# Patient Record
Sex: MALE | Race: NATIVE HAWAIIAN OR OTHER PACIFIC ISLANDER | NOT HISPANIC OR LATINO | URBAN - METROPOLITAN AREA
[De-identification: names, ages, dates, MRNs, and addresses within clinical notes are randomized per-mention and may not be internally consistent; named-entity substitution may affect disease eponyms.]

---

## 2020-01-21 ENCOUNTER — INPATIENT (INPATIENT)
Facility: HOSPITAL | Age: 50
LOS: 2 days | Discharge: HOME | DRG: 247 | End: 2020-01-24
Attending: INTERNAL MEDICINE | Admitting: INTERNAL MEDICINE
Payer: COMMERCIAL

## 2020-01-21 VITALS
TEMPERATURE: 98 F | HEIGHT: 69 IN | HEART RATE: 70 BPM | WEIGHT: 179.9 LBS | SYSTOLIC BLOOD PRESSURE: 145 MMHG | RESPIRATION RATE: 20 BRPM | DIASTOLIC BLOOD PRESSURE: 100 MMHG | OXYGEN SATURATION: 97 %

## 2020-01-21 DIAGNOSIS — I25.10 ATHEROSCLEROTIC HEART DISEASE OF NATIVE CORONARY ARTERY WITHOUT ANGINA PECTORIS: ICD-10-CM

## 2020-01-21 LAB
ALBUMIN SERPL ELPH-MCNC: 4.1 G/DL — SIGNIFICANT CHANGE UP (ref 3.3–5)
ALP SERPL-CCNC: 86 U/L — SIGNIFICANT CHANGE UP (ref 40–120)
ALT FLD-CCNC: 44 U/L — SIGNIFICANT CHANGE UP (ref 10–45)
ANION GAP SERPL CALC-SCNC: 12 MMOL/L — SIGNIFICANT CHANGE UP (ref 5–17)
AST SERPL-CCNC: 41 U/L — HIGH (ref 10–40)
BILIRUB SERPL-MCNC: 0.4 MG/DL — SIGNIFICANT CHANGE UP (ref 0.2–1.2)
BUN SERPL-MCNC: 11 MG/DL — SIGNIFICANT CHANGE UP (ref 7–23)
CALCIUM SERPL-MCNC: 9 MG/DL — SIGNIFICANT CHANGE UP (ref 8.4–10.5)
CHLORIDE SERPL-SCNC: 101 MMOL/L — SIGNIFICANT CHANGE UP (ref 96–108)
CO2 SERPL-SCNC: 24 MMOL/L — SIGNIFICANT CHANGE UP (ref 22–31)
CREAT SERPL-MCNC: 0.82 MG/DL — SIGNIFICANT CHANGE UP (ref 0.5–1.3)
D DIMER BLD IA.RAPID-MCNC: 197 NG/ML DDU — SIGNIFICANT CHANGE UP
GLUCOSE SERPL-MCNC: 100 MG/DL — HIGH (ref 70–99)
HCT VFR BLD CALC: 43.8 % — SIGNIFICANT CHANGE UP (ref 39–50)
HGB BLD-MCNC: 15 G/DL — SIGNIFICANT CHANGE UP (ref 13–17)
MCHC RBC-ENTMCNC: 30.9 PG — SIGNIFICANT CHANGE UP (ref 27–34)
MCHC RBC-ENTMCNC: 34.2 GM/DL — SIGNIFICANT CHANGE UP (ref 32–36)
MCV RBC AUTO: 90.1 FL — SIGNIFICANT CHANGE UP (ref 80–100)
NRBC # BLD: 0 /100 WBCS — SIGNIFICANT CHANGE UP (ref 0–0)
NT-PROBNP SERPL-SCNC: 11 PG/ML — SIGNIFICANT CHANGE UP (ref 0–300)
PLATELET # BLD AUTO: 205 K/UL — SIGNIFICANT CHANGE UP (ref 150–400)
POTASSIUM SERPL-MCNC: 4.2 MMOL/L — SIGNIFICANT CHANGE UP (ref 3.5–5.3)
POTASSIUM SERPL-SCNC: 4.2 MMOL/L — SIGNIFICANT CHANGE UP (ref 3.5–5.3)
PROT SERPL-MCNC: 7.1 G/DL — SIGNIFICANT CHANGE UP (ref 6–8.3)
RBC # BLD: 4.86 M/UL — SIGNIFICANT CHANGE UP (ref 4.2–5.8)
RBC # FLD: 12.4 % — SIGNIFICANT CHANGE UP (ref 10.3–14.5)
SODIUM SERPL-SCNC: 137 MMOL/L — SIGNIFICANT CHANGE UP (ref 135–145)
TROPONIN T, HIGH SENSITIVITY RESULT: <6 NG/L — SIGNIFICANT CHANGE UP (ref 0–51)
TROPONIN T, HIGH SENSITIVITY RESULT: <6 NG/L — SIGNIFICANT CHANGE UP (ref 0–51)
WBC # BLD: 5.39 K/UL — SIGNIFICANT CHANGE UP (ref 3.8–10.5)
WBC # FLD AUTO: 5.39 K/UL — SIGNIFICANT CHANGE UP (ref 3.8–10.5)

## 2020-01-21 PROCEDURE — 99223 1ST HOSP IP/OBS HIGH 75: CPT

## 2020-01-21 PROCEDURE — 71046 X-RAY EXAM CHEST 2 VIEWS: CPT | Mod: 26

## 2020-01-21 PROCEDURE — 99285 EMERGENCY DEPT VISIT HI MDM: CPT

## 2020-01-21 PROCEDURE — 93010 ELECTROCARDIOGRAM REPORT: CPT | Mod: 59

## 2020-01-21 RX ORDER — NITROGLYCERIN 6.5 MG
0.4 CAPSULE, EXTENDED RELEASE ORAL
Refills: 0 | Status: DISCONTINUED | OUTPATIENT
Start: 2020-01-21 | End: 2020-01-23

## 2020-01-21 RX ORDER — SODIUM CHLORIDE 9 MG/ML
3 INJECTION INTRAMUSCULAR; INTRAVENOUS; SUBCUTANEOUS EVERY 8 HOURS
Refills: 0 | Status: DISCONTINUED | OUTPATIENT
Start: 2020-01-21 | End: 2020-01-24

## 2020-01-21 RX ORDER — ASPIRIN/CALCIUM CARB/MAGNESIUM 324 MG
243 TABLET ORAL ONCE
Refills: 0 | Status: COMPLETED | OUTPATIENT
Start: 2020-01-21 | End: 2020-01-21

## 2020-01-21 RX ORDER — SODIUM CHLORIDE 9 MG/ML
1000 INJECTION INTRAMUSCULAR; INTRAVENOUS; SUBCUTANEOUS ONCE
Refills: 0 | Status: COMPLETED | OUTPATIENT
Start: 2020-01-21 | End: 2020-01-21

## 2020-01-21 RX ORDER — ASPIRIN/CALCIUM CARB/MAGNESIUM 324 MG
81 TABLET ORAL DAILY
Refills: 0 | Status: DISCONTINUED | OUTPATIENT
Start: 2020-01-21 | End: 2020-01-23

## 2020-01-21 RX ADMIN — Medication 81 MILLIGRAM(S): at 22:36

## 2020-01-21 RX ADMIN — Medication 0.4 MILLIGRAM(S): at 23:42

## 2020-01-21 RX ADMIN — SODIUM CHLORIDE 1000 MILLILITER(S): 9 INJECTION INTRAMUSCULAR; INTRAVENOUS; SUBCUTANEOUS at 23:45

## 2020-01-21 RX ADMIN — Medication 0.4 MILLIGRAM(S): at 22:36

## 2020-01-21 RX ADMIN — Medication 243 MILLIGRAM(S): at 22:37

## 2020-01-21 NOTE — H&P ADULT - NSICDXFAMILYHX_GEN_ALL_CORE_FT
FAMILY HISTORY:  FH: CAD (coronary artery disease), Father s/p stent at 71 y/o age  FH: diabetes mellitus, Mother  FH: hypertension, Mother

## 2020-01-21 NOTE — H&P ADULT - NSHPATTENDINGPLANDISCUSS_GEN_ALL_CORE
Dr. Ham who requested for initial evaluation/H&P and will assume care of this patient in am of 1/22/20.

## 2020-01-21 NOTE — ED ADULT TRIAGE NOTE - CHIEF COMPLAINT QUOTE
Patient reports CP x2 days. Pain is L sided radiating to the L arm and back. Endorses pain in the back of the head. PMHx CAD w/ stent, MI, HTN, HLD. Takes 81mg ASA daily. Patient reports CP x2 days. Pain is L sided radiating to the L arm and back. Endorses pain in the back of the head. Patient flew in from [facility] 3 days ago. PMHx CAD w/ stent, MI, HTN, HLD. Takes 81mg ASA daily.

## 2020-01-21 NOTE — H&P ADULT - HISTORY OF PRESENT ILLNESS
48 yo Malaysian male pmhx of HTN, HLD, CAD sp 1 stent 4 years ago, flew back from [facility] 3 days ago, presents with 2 days of left retrosternal stabbing chest pain radiating to L arm and neck, not pleuritic, not reproducible. Pt states he was sitting on the cough when it started. Pt endorse sensation of "food stuck in throat", no SOB, no nausea, no vomiting, no calf pain. Due to insurance reasons, pt has been taking no meds for the past 6 months, only ASA81 daily. took it today. 48 yo Chinese male pmhx of HTN, HLD, CAD sp 1 stent 4 years ago, flew back from [facility] 3 days ago, presents with 2 days of left retrosternal stabbing chest pain radiating to L arm and neck, not pleuritic, not reproducible. Pt states he was sitting on the cough when it started. Pt endorse sensation of "food stuck in throat", no SOB, no nausea, no vomiting, no calf pain. Due to insurance reasons, pt has been taking no meds for the past 6 months, only ASA81 daily. took it today.   non compliant with meds except with asa (due to insurance) who is presenting with 3-4 days of progressive chest heaviness, feeling of a globe in chest and lower throat, dizziness, and shortness of breath. reports feeling more out of breath with movements. denies fever chills, denies n/v, abd pain. no leg swelling.   pt had a syncopal event 1-2 months prior. also traveled back on long haul flight same time symptoms started. 48 yo Ethiopian male pmhx of HTN, HLD, CAD s/p stent (4 years ago at [facility] with known 60-70% obstructive CAD) p/w 2 days of intermittent left retrosternal stabbing chest pain radiating to L arm and back, lasted about a couple minutes, resolved spontaneously, no association to exertion and not similar to prior cardiac pain (heaviness).  Patient visited [facility] for 10 days (J[place] ceremony) and returned to [place] 3 days ago.  Patient felt "hot", chills, sore throat/"food stuck" and myalgia for the past few days which he thinks was the flu, now improved. Denies HA, cough, SOB, n/v, abd pain, SOB, or diarrhea. Patient exercises regularly (1hr on treadmill 3x a week), but has not exercise in the past month due to travel.  Last routine stress test was 7 months ago and reported normal.  Patient has not been taking BP and HLD meds for tony past 7 months due to loss of insurance, but taking daily ASA 81.  Patient had a syncopal episode 2 months ago while visiting a friend at [facility], followed by nausea/vomiting.  He was hospitalized shortly to "monitor the heart, blood tests x3, and ultrasound of the heart".  Patient had 3 episodes of CP in ED, received NTG SL x3 with improvement of CP.  Currently CP free.

## 2020-01-21 NOTE — CONSULT NOTE ADULT - ASSESSMENT
EKG SR     Echo pending     Assessment and Plan    1) CP: Pt has a history of CAD s/p MI and PCI 4 years ago at [facility], was told that he has residual 60-70% stenosis CP improved with nitro in the ER, also wife states he had an episode of syncope 1 month ago. EKG non ischemic , 1st  set of trops -chaz,  Will get echo and plan for cath c/w asa , start lipitor 80, metoprolol XL 25 mg po daily, if delta trop + would start heparin drip, monitor on tele       2) HTN: start metoprolol 25 mg po daily     3) DVT PPX recommend Lovenox

## 2020-01-21 NOTE — ED ADULT NURSE NOTE - CHIEF COMPLAINT QUOTE
Patient reports CP x2 days. Pain is L sided radiating to the L arm and back. Endorses pain in the back of the head. Patient flew in from [facility] 3 days ago. PMHx CAD w/ stent, MI, HTN, HLD. Takes 81mg ASA daily.

## 2020-01-21 NOTE — CONSULT NOTE ADULT - SUBJECTIVE AND OBJECTIVE BOX
Rodney Gillespie MD  Interventional Cardiology / Endovascular Specialist  [place] Office : [address] [place], [place] [address]  Tel:   [place] Office : [address] [facility] [place] [address]  Tel: [phone]  Cell : [phone]      HISTORY OF PRESENTING ILLNESS:    50 yo Kuwaiti male pmhx of HTN, HLD, CAD sp 1 stent 4 years ago, flew back from [facility] 3 days ago, presents with 2 days of left retrosternal stabbing chest pain radiating to L arm and neck, not pleuritic, not reproducible. Pt states he was sitting on the cough when it started. no SOB, no nausea, no vomiting, no calf pain. Due to insurance reasons, pt has been taking no meds for the past 6 months, only ASA81 daily. took it today.    PAST MEDICAL & SURGICAL HISTORY:  CAD (coronary artery disease)  HLD (hyperlipidemia)  HTN (hypertension)  No significant past surgical history      SOCIAL HISTORY: Substance Use (street drugs): ( x ) never used  (  ) other:    FAMILY HISTORY:      REVIEW OF SYSTEMS:  CONSTITUTIONAL: No fever, weight loss, or fatigue  EYES: No eye pain, visual disturbances, or discharge  ENMT:  No difficulty hearing, tinnitus, vertigo; No sinus or throat pain  BREASTS: No pain, masses, or nipple discharge  GASTROINTESTINAL: No abdominal or epigastric pain. No nausea, vomiting, or hematemesis; No diarrhea or constipation. No melena or hematochezia.  GENITOURINARY: No dysuria, frequency, hematuria, or incontinence  NEUROLOGICAL: No headaches, memory loss, loss of strength, numbness, or tremors  ENDOCRINE: No heat or cold intolerance; No hair loss  MUSCULOSKELETAL: No joint pain or swelling; No muscle, back, or extremity pain  PSYCHIATRIC: No depression, anxiety, mood swings, or difficulty sleeping  HEME/LYMPH: No easy bruising, or bleeding gums  All others negative    MEDICATIONS:  aspirin  chewable 81 milliGRAM(s) Oral daily  nitroglycerin     SubLingual 0.4 milliGRAM(s) SubLingual every 5 minutes PRN              sodium chloride 0.9% lock flush 3 milliLiter(s) IV Push every 8 hours      FAMILY HISTORY:        Allergies    No Known Allergies    Intolerances    	      PHYSICAL EXAM:  T(C): 36.8 (01-21-20 @ 22:42), Max: 37.3 (01-21-20 @ 20:21)  HR: 90 (01-21-20 @ 22:42) (68 - 90)  BP: 133/82 (01-21-20 @ 22:42) (133/82 - 145/100)  RR: 20 (01-21-20 @ 22:42) (20 - 20)  SpO2: 99% (01-21-20 @ 22:42) (97% - 99%)  Wt(kg): --  I&O's Summary      GENERAL: NAD, well-groomed, well-developed  EYES: EOMI, PERRLA, conjunctiva and sclera clear  ENMT: No tonsillar erythema, exudates, or enlargement; Moist mucous membranes, Good dentition, No lesions  Cardiovascular: Normal S1 S2, No JVD, No murmurs, No edema  Respiratory: Lungs clear to auscultation	  Gastrointestinal:  Soft, Non-tender, + BS	  Extremities: Normal range of motion, No clubbing, cyanosis or edema  LYMPH: No lymphadenopathy noted  NERVOUS SYSTEM:  Alert & Oriented X3, Good concentration; Motor Strength 5/5 B/L upper and lower extremities; DTRs 2+ intact and symmetric      LABS:	 	    CARDIAC MARKERS:                                  15.0   5.39  )-----------( 205      ( 21 Jan 2020 20:30 )             43.8     01-21    137  |  101  |  11  ----------------------------<  100<H>  4.2   |  24  |  0.82    Ca    9.0      21 Jan 2020 20:30    TPro  7.1  /  Alb  4.1  /  TBili  0.4  /  DBili  x   /  AST  41<H>  /  ALT  44  /  AlkPhos  86  01-21    proBNP: Serum Pro-Brain Natriuretic Peptide: 11 pg/mL (01-21 @ 20:30)    Lipid Profile:   HgA1c:   TSH:     Consultant(s) Notes Reviewed:  [x ] YES  [ ] NO    Care Discussed with Consultants/Other Providers [ x] YES  [ ] NO    Imaging Personally Reviewed independently:  [x] YES  [ ] NO    All labs, radiologic studies, vitals, orders and medications list reviewed. Patient is seen and examined at bedside. Case discussed with medical team.    ASSESSMENT/PLAN:

## 2020-01-21 NOTE — H&P ADULT - CONSTITUTIONAL
----- Message from Dean Price MD sent at 10/23/2019  1:07 PM CDT -----  Normal results.
Leandra with [facility] called to let Dr. Dimitri Geronimo know that the pt completed her mammogram today and the report is in Epic. Message routed to provider JOSELYN.
[facility] results released through [address] [place] [address] [address].
ok
detailed exam

## 2020-01-21 NOTE — H&P ADULT - ADDITIONAL PE
T(F): 98.2 (21 Jan 2020 22:42), Max: 99.1 (21 Jan 2020 20:21)  HR: 68 - 90  BP: 133/82 - 145/100  RR: 20  SpO2: 97% - 99%

## 2020-01-21 NOTE — H&P ADULT - PROBLEM SELECTOR PLAN 1
EARL 3   - will check serial cardiac enzymes  - tele monitor for arrhythmia   - will treat with ASA, Toprol and high intensity statin  - will start Hep gtt if Trop +  - Cardio eval appreciated  - will check TTE to eval wall motion  - will check FLP to risk stratify

## 2020-01-21 NOTE — ED PROVIDER NOTE - ATTENDING CONTRIBUTION TO CARE
49yr M hx of htn, hl, MI s/p stent 4 yrs ago who has been non compliant with meds except with asa (due to insurance) who is presenting with 3-4 days of progressive chest heaviness, feeling of a globe in chest and lower throat, dizziness, and shortness of breath. reports feeling more out of breath with movements. denies fever chills, denies n/v, abd pain. no leg swelling.   pt had a syncopal event 1-2 months prior. also traveled back on long haul flight same time symptoms started.  exam notable for calm and comfortable appearing, no murmur, clear lungs, soft non tender abd, no leg swelling.  concern for ACS high, low concern for PE however pt is moderate risk so d dimer to rule out.   if trop neg, will stay for serial trops, and tele given syncopal event as well as AM echo and cards for re-establishing care.

## 2020-01-21 NOTE — ED ADULT NURSE NOTE - OBJECTIVE STATEMENT
49YOM pmh stent 4 years ago, HTN presents to ED c/o CP x2 days. Left sided and feels stabbing. Pt was diagnosed with flu couple days ago. Pt states he first felt like it was his flu making him feel that way, but got worse. Denies SOB, fever, chills, abp pain, N/V/D, burning upon urination. Safety and comfort measures provided. Pt on cardiac monitor, EKG done. Will continue to monitor. Wife at bedside.

## 2020-01-21 NOTE — ED PROVIDER NOTE - PHYSICAL EXAMINATION
General: Patient awake alert NAD.   HEENT: normocephalic, atraumatic, EOMI, no scleral icterus, moist MM  Cardiac: RRR, S1, S2, no murmur, equal bilateral radial pulse.   LUNGS: CTA B/L no wheeze, rhonchi, rales.   Abdomen: soft NT, ND, no rebound no guarding, no CVA tenderness.   EXT: no edema. 5/5 strength and full ROM in all extremities.     Neuro: A&Ox3, gait normal, no focal neurological deficits, CN 2-12 grossly intact  Skin: warm, dry, no rash.

## 2020-01-21 NOTE — ED PROVIDER NOTE - NS ED ROS FT
GENERAL: No fever, chills  EYES: no vision changes, no discharge.   HEENT: no difficulty swallowing or speaking   CARDIAC: + chest pain, no SOB, no lower ex edema  PULMONARY: no cough, no SOB  GI: no abdominal pain, n/v/d  : no dysuria  SKIN: no rashes  NEURO: + headache, no lightheadedness, no paresthesia  MSK: No joint pain, myalgia, weakness.

## 2020-01-21 NOTE — H&P ADULT - ASSESSMENT
48 yo [place] Islander male pmhx of HTN, HLD, CAD sp 1 stent 4 years ago p/w chest pain a/w 48 yo Eritrean male pmhx of HTN, HLD, CAD sp 1 stent 4 years ago p/w chest pain a/w     Pt has a history of CAD s/p MI and PCI 4 years ago at [facility], was told that he has residual 60-70% stenosis CP improved with nitro in the ER, also wife states he had an episode of syncope 1 month ago. EKG non ischemic , 1st  set of trops -chaz,  Will get echo and plan for cath c/w asa , start lipitor 80, metoprolol XL 25 mg po daily, if delta trop + would start heparin drip, monitor on tele 50 yo Spanish male pmhx of HTN, HLD, CAD s/p stent (4 years ago at [facility] with known 60-70% obstructive CAD) p/w L sided CP admitted with concern for unstable angina.

## 2020-01-21 NOTE — ED PROVIDER NOTE - OBJECTIVE STATEMENT
50 yo Ivorian male pmhx of HTN, HLD, CAD sp 1 stent 4 years ago, flew back from [facility] 3 days ago, presents with 2 days of left retrosternal stabbing chest pain radiating to L arm and neck, not pleuritic, not reproducible. Pt states he was sitting on the cough when it started. Pt endorse sensation of "food stuck in throat", no SOB, no nausea, no vomiting, no calf pain. Due to insurance reasons, pt has been taking no meds for the past 6 months, only ASA81 daily. took it today.

## 2020-01-21 NOTE — ED ADULT NURSE REASSESSMENT NOTE - NS ED NURSE REASSESS COMMENT FT1
Pt had episode of lightheadedness. MD Ayers at bedside. EKG repeat. Pt A&Ox4. 1L of NS saline given. Pt resting comfortably. In no acute distress. Wife at bedside.

## 2020-01-22 DIAGNOSIS — R09.89 OTHER SPECIFIED SYMPTOMS AND SIGNS INVOLVING THE CIRCULATORY AND RESPIRATORY SYSTEMS: ICD-10-CM

## 2020-01-22 DIAGNOSIS — I10 ESSENTIAL (PRIMARY) HYPERTENSION: ICD-10-CM

## 2020-01-22 DIAGNOSIS — E78.49 OTHER HYPERLIPIDEMIA: ICD-10-CM

## 2020-01-22 DIAGNOSIS — I25.110 ATHEROSCLEROTIC HEART DISEASE OF NATIVE CORONARY ARTERY WITH UNSTABLE ANGINA PECTORIS: ICD-10-CM

## 2020-01-22 DIAGNOSIS — Z90.49 ACQUIRED ABSENCE OF OTHER SPECIFIED PARTS OF DIGESTIVE TRACT: Chronic | ICD-10-CM

## 2020-01-22 DIAGNOSIS — Z29.9 ENCOUNTER FOR PROPHYLACTIC MEASURES, UNSPECIFIED: ICD-10-CM

## 2020-01-22 LAB
ANION GAP SERPL CALC-SCNC: 8 MMOL/L — SIGNIFICANT CHANGE UP (ref 5–17)
BUN SERPL-MCNC: 10 MG/DL — SIGNIFICANT CHANGE UP (ref 7–23)
CALCIUM SERPL-MCNC: 8.9 MG/DL — SIGNIFICANT CHANGE UP (ref 8.4–10.5)
CHLORIDE SERPL-SCNC: 100 MMOL/L — SIGNIFICANT CHANGE UP (ref 96–108)
CHOLEST SERPL-MCNC: 220 MG/DL — HIGH (ref 10–199)
CK MB BLD-MCNC: 1.9 % — SIGNIFICANT CHANGE UP (ref 0–3.5)
CK MB CFR SERPL CALC: 2 NG/ML — SIGNIFICANT CHANGE UP (ref 0–6.7)
CK SERPL-CCNC: 103 U/L — SIGNIFICANT CHANGE UP (ref 30–200)
CO2 SERPL-SCNC: 24 MMOL/L — SIGNIFICANT CHANGE UP (ref 22–31)
CREAT SERPL-MCNC: 0.75 MG/DL — SIGNIFICANT CHANGE UP (ref 0.5–1.3)
GLUCOSE SERPL-MCNC: 141 MG/DL — HIGH (ref 70–99)
HBA1C BLD-MCNC: 5.6 % — SIGNIFICANT CHANGE UP (ref 4–5.6)
HCT VFR BLD CALC: 42.4 % — SIGNIFICANT CHANGE UP (ref 39–50)
HDLC SERPL-MCNC: 45 MG/DL — SIGNIFICANT CHANGE UP
HGB BLD-MCNC: 14.4 G/DL — SIGNIFICANT CHANGE UP (ref 13–17)
LIPID PNL WITH DIRECT LDL SERPL: 141 MG/DL — HIGH
MCHC RBC-ENTMCNC: 30.7 PG — SIGNIFICANT CHANGE UP (ref 27–34)
MCHC RBC-ENTMCNC: 34 GM/DL — SIGNIFICANT CHANGE UP (ref 32–36)
MCV RBC AUTO: 90.4 FL — SIGNIFICANT CHANGE UP (ref 80–100)
NRBC # BLD: 0 /100 WBCS — SIGNIFICANT CHANGE UP (ref 0–0)
PLATELET # BLD AUTO: 195 K/UL — SIGNIFICANT CHANGE UP (ref 150–400)
POTASSIUM SERPL-MCNC: 3.7 MMOL/L — SIGNIFICANT CHANGE UP (ref 3.5–5.3)
POTASSIUM SERPL-SCNC: 3.7 MMOL/L — SIGNIFICANT CHANGE UP (ref 3.5–5.3)
RBC # BLD: 4.69 M/UL — SIGNIFICANT CHANGE UP (ref 4.2–5.8)
RBC # FLD: 12.5 % — SIGNIFICANT CHANGE UP (ref 10.3–14.5)
SODIUM SERPL-SCNC: 132 MMOL/L — LOW (ref 135–145)
TOTAL CHOLESTEROL/HDL RATIO MEASUREMENT: 4.9 RATIO — SIGNIFICANT CHANGE UP (ref 3.4–9.6)
TRIGL SERPL-MCNC: 172 MG/DL — HIGH (ref 10–149)
TROPONIN T, HIGH SENSITIVITY RESULT: <6 NG/L — SIGNIFICANT CHANGE UP (ref 0–51)
WBC # BLD: 5.4 K/UL — SIGNIFICANT CHANGE UP (ref 3.8–10.5)
WBC # FLD AUTO: 5.4 K/UL — SIGNIFICANT CHANGE UP (ref 3.8–10.5)

## 2020-01-22 PROCEDURE — 93306 TTE W/DOPPLER COMPLETE: CPT | Mod: 26

## 2020-01-22 RX ORDER — ATORVASTATIN CALCIUM 80 MG/1
80 TABLET, FILM COATED ORAL AT BEDTIME
Refills: 0 | Status: DISCONTINUED | OUTPATIENT
Start: 2020-01-22 | End: 2020-01-24

## 2020-01-22 RX ORDER — METOPROLOL TARTRATE 50 MG
25 TABLET ORAL DAILY
Refills: 0 | Status: DISCONTINUED | OUTPATIENT
Start: 2020-01-22 | End: 2020-01-24

## 2020-01-22 RX ORDER — ISOSORBIDE MONONITRATE 60 MG/1
30 TABLET, EXTENDED RELEASE ORAL DAILY
Refills: 0 | Status: DISCONTINUED | OUTPATIENT
Start: 2020-01-22 | End: 2020-01-24

## 2020-01-22 RX ORDER — ENOXAPARIN SODIUM 100 MG/ML
40 INJECTION SUBCUTANEOUS DAILY
Refills: 0 | Status: DISCONTINUED | OUTPATIENT
Start: 2020-01-22 | End: 2020-01-24

## 2020-01-22 RX ORDER — POTASSIUM CHLORIDE 20 MEQ
20 PACKET (EA) ORAL ONCE
Refills: 0 | Status: COMPLETED | OUTPATIENT
Start: 2020-01-22 | End: 2020-01-22

## 2020-01-22 RX ORDER — INFLUENZA VIRUS VACCINE 15; 15; 15; 15 UG/.5ML; UG/.5ML; UG/.5ML; UG/.5ML
0.5 SUSPENSION INTRAMUSCULAR ONCE
Refills: 0 | Status: DISCONTINUED | OUTPATIENT
Start: 2020-01-22 | End: 2020-01-24

## 2020-01-22 RX ORDER — ACETAMINOPHEN 500 MG
650 TABLET ORAL ONCE
Refills: 0 | Status: COMPLETED | OUTPATIENT
Start: 2020-01-22 | End: 2020-01-22

## 2020-01-22 RX ADMIN — SODIUM CHLORIDE 3 MILLILITER(S): 9 INJECTION INTRAMUSCULAR; INTRAVENOUS; SUBCUTANEOUS at 21:26

## 2020-01-22 RX ADMIN — Medication 81 MILLIGRAM(S): at 09:08

## 2020-01-22 RX ADMIN — SODIUM CHLORIDE 3 MILLILITER(S): 9 INJECTION INTRAMUSCULAR; INTRAVENOUS; SUBCUTANEOUS at 05:34

## 2020-01-22 RX ADMIN — Medication 20 MILLIEQUIVALENT(S): at 16:45

## 2020-01-22 RX ADMIN — Medication 650 MILLIGRAM(S): at 18:54

## 2020-01-22 RX ADMIN — Medication 25 MILLIGRAM(S): at 09:08

## 2020-01-22 RX ADMIN — ATORVASTATIN CALCIUM 80 MILLIGRAM(S): 80 TABLET, FILM COATED ORAL at 21:26

## 2020-01-22 RX ADMIN — SODIUM CHLORIDE 3 MILLILITER(S): 9 INJECTION INTRAMUSCULAR; INTRAVENOUS; SUBCUTANEOUS at 13:07

## 2020-01-22 RX ADMIN — Medication 650 MILLIGRAM(S): at 19:24

## 2020-01-22 RX ADMIN — ISOSORBIDE MONONITRATE 30 MILLIGRAM(S): 60 TABLET, EXTENDED RELEASE ORAL at 12:54

## 2020-01-22 NOTE — PROGRESS NOTE ADULT - ASSESSMENT
EKG SR     Echo pending     Assessment and Plan    1) CP: Pt has a history of CAD s/p MI and PCI 4 years ago at [facility], was told that he has residual 60-70% stenosis CP improved with nitro in the ER, also wife states he had an episode of syncope 1 month ago. EKG non ischemic , 1st  set of trops -chaz,  Will get echo and plan for cath tomorrow c/w asa , start lipitor 80, metoprolol XL 25 mg po daily,     2) HTN: start metoprolol 25 mg po daily     3) DVT PPX recommend Lovenox

## 2020-01-22 NOTE — PROGRESS NOTE ADULT - SUBJECTIVE AND OBJECTIVE BOX
Rodney Gillespie MD  Interventional Cardiology / Advance Heart Failure and Cardiac Transplant Specialist  [place] Office : [address] [place] [address] [place] [address]  Tel:   [place] Office : [address] [facility] [place] [address]  Tel: [phone]  Cell : [phone]    Pt is lying in bed comfortable not in distress, no chest pains no SOB no palpitations  	  MEDICATIONS:  aspirin  chewable 81 milliGRAM(s) Oral daily  enoxaparin Injectable 40 milliGRAM(s) SubCutaneous daily  isosorbide   mononitrate ER Tablet (IMDUR) 30 milliGRAM(s) Oral daily  metoprolol succinate ER 25 milliGRAM(s) Oral daily  nitroglycerin     SubLingual 0.4 milliGRAM(s) SubLingual every 5 minutes PRN            atorvastatin 80 milliGRAM(s) Oral at bedtime    influenza   Vaccine 0.5 milliLiter(s) IntraMuscular once  sodium chloride 0.9% lock flush 3 milliLiter(s) IV Push every 8 hours      PAST MEDICAL/SURGICAL HISTORY  PAST MEDICAL & SURGICAL HISTORY:  CAD (coronary artery disease)  HLD (hyperlipidemia)  HTN (hypertension)  S/P appendectomy      SOCIAL HISTORY: Substance Use (street drugs): ( x ) never used  (  ) other:    FAMILY HISTORY:  FH: CAD (coronary artery disease): Father s/p stent at 69 y/o age  FH: hypertension: Mother  FH: diabetes mellitus: Mother      REVIEW OF SYSTEMS:  CONSTITUTIONAL: No fever, weight loss, or fatigue  EYES: No eye pain, visual disturbances, or discharge  ENMT:  No difficulty hearing, tinnitus, vertigo; No sinus or throat pain  BREASTS: No pain, masses, or nipple discharge  GASTROINTESTINAL: No abdominal or epigastric pain. No nausea, vomiting, or hematemesis; No diarrhea or constipation. No melena or hematochezia.  GENITOURINARY: No dysuria, frequency, hematuria, or incontinence  NEUROLOGICAL: No headaches, memory loss, loss of strength, numbness, or tremors  ENDOCRINE: No heat or cold intolerance; No hair loss  MUSCULOSKELETAL: No joint pain or swelling; No muscle, back, or extremity pain  PSYCHIATRIC: No depression, anxiety, mood swings, or difficulty sleeping  HEME/LYMPH: No easy bruising, or bleeding gums  All others negative    PHYSICAL EXAM:  T(C): 36.6 (01-22-20 @ 12:00), Max: 37.3 (01-21-20 @ 20:21)  HR: 68 (01-22-20 @ 12:00) (55 - 90)  BP: 116/67 (01-22-20 @ 12:00) (94/57 - 145/100)  RR: 18 (01-22-20 @ 12:00) (16 - 20)  SpO2: 94% (01-22-20 @ 12:00) (94% - 99%)  Wt(kg): --  I&O's Summary    22 Jan 2020 07:01  -  22 Jan 2020 15:02  --------------------------------------------------------  IN: 420 mL / OUT: 0 mL / NET: 420 mL      Height (cm): 175.26 (01-21 @ 19:26)  Weight (kg): 81.6 (01-21 @ 19:26)  BMI (kg/m2): 26.6 (01-21 @ 19:26)  BSA (m2): 1.97 (01-21 @ 19:26)    GENERAL: NAD  EYES: EOMI, PERRLA, conjunctiva and sclera clear  ENMT: No tonsillar erythema, exudates, or enlargement; Moist mucous membranes, Good dentition, No lesions  Cardiovascular: Normal S1 S2, No JVD, No murmurs, No edema  Respiratory: Lungs clear to auscultation	  Gastrointestinal:  Soft, Non-tender, + BS	  Extremities: Normal range of motion, No clubbing, cyanosis or edema  LYMPH: No lymphadenopathy noted  NERVOUS SYSTEM:  Alert & Oriented X3, Good concentration; Motor Strength 5/5 B/L upper and lower extremities; DTRs 2+ intact and symmetric                                    14.4   5.40  )-----------( 195      ( 22 Jan 2020 05:45 )             42.4     01-22    132<L>  |  100  |  10  ----------------------------<  141<H>  3.7   |  24  |  0.75    Ca    8.9      22 Jan 2020 05:45    TPro  7.1  /  Alb  4.1  /  TBili  0.4  /  DBili  x   /  AST  41<H>  /  ALT  44  /  AlkPhos  86  01-21    proBNP: Serum Pro-Brain Natriuretic Peptide: 11 pg/mL (01-21 @ 20:30)    Lipid Profile:   HgA1c: Hemoglobin A1C, Whole Blood: 5.6 % (01-22 @ 05:13)    TSH:     Consultant(s) Notes Reviewed:  [x ] YES  [ ] NO    Care Discussed with Consultants/Other Providers [ x] YES  [ ] NO    Imaging Personally Reviewed independently:  [x] YES  [ ] NO    All labs, radiologic studies, vitals, orders and medications list reviewed. Patient is seen and examined at bedside. Case discussed with medical team.

## 2020-01-22 NOTE — PROVIDER CONTACT NOTE (OTHER) - ACTION/TREATMENT ORDERED:
NP assessed patient, patient's headache decreased to 2/10 with no interventions. No new orders. Continue to monitor patient.

## 2020-01-22 NOTE — PROGRESS NOTE ADULT - ASSESSMENT
EKG SR     Echo pending     Assessment and Plan    1) CP: Pt has a history of CAD s/p MI and PCI 4 years ago at [facility], was told that he has residual 60-70% stenosis CP improved with nitro in the ER, also wife states he had an episode of syncope 1 month ago. EKG non ischemic , 1st  set of trops -chaz,  Will get echo and plan for cath tomorrow c/w asa , start lipitor 80, metoprolol XL 25 mg po daily,     2) HTN: start metoprolol 25 mg po daily     3) DVT PPX recommend Lovenox

## 2020-01-22 NOTE — PROGRESS NOTE ADULT - SUBJECTIVE AND OBJECTIVE BOX
SUBJECTIVE / OVERNIGHT EVENTS: pt denies chest pain, shortness of breath       MEDICATIONS  (STANDING):  aspirin  chewable 81 milliGRAM(s) Oral daily  atorvastatin 80 milliGRAM(s) Oral at bedtime  enoxaparin Injectable 40 milliGRAM(s) SubCutaneous daily  influenza   Vaccine 0.5 milliLiter(s) IntraMuscular once  isosorbide   mononitrate ER Tablet (IMDUR) 30 milliGRAM(s) Oral daily  metoprolol succinate ER 25 milliGRAM(s) Oral daily  sodium chloride 0.9% lock flush 3 milliLiter(s) IV Push every 8 hours    MEDICATIONS  (PRN):  nitroglycerin     SubLingual 0.4 milliGRAM(s) SubLingual every 5 minutes PRN Chest Pain    Vital Signs Last 24 Hrs  T(C): 36.6 (22 Jan 2020 12:00), Max: 37.3 (21 Jan 2020 20:21)  T(F): 97.9 (22 Jan 2020 12:00), Max: 99.1 (21 Jan 2020 20:21)  HR: 68 (22 Jan 2020 12:00) (55 - 90)  BP: 116/67 (22 Jan 2020 12:00) (94/57 - 145/100)  BP(mean): --  RR: 18 (22 Jan 2020 12:00) (16 - 20)  SpO2: 94% (22 Jan 2020 12:00) (94% - 99%)      CAPILLARY BLOOD GLUCOSE        I&O's Summary    22 Jan 2020 07:01  -  22 Jan 2020 17:46  --------------------------------------------------------  IN: 420 mL / OUT: 0 mL / NET: 420 mL        Constitutional: No fever, fatigue  Skin: No rash.  Eyes: No recent vision problems or eye pain.  ENT: No congestion, ear pain, or sore throat.  Cardiovascular: No chest pain or palpation.  Respiratory: No cough, shortness of breath, congestion, or wheezing.  Gastrointestinal: No abdominal pain, nausea, vomiting, or diarrhea.  Genitourinary: No dysuria.  Musculoskeletal: No joint swelling.  Neurologic: No headache.    PHYSICAL EXAM:  GENERAL: NAD  EYES: EOMI, PERRLA  NECK: Supple, No JVD  CHEST/LUNG: cta ludy  HEART:  S1 , S2 +  ABDOMEN: soft , bs+  EXTREMITIES:no edema    NEUROLOGY:alert awake       LABS:                        14.4   5.40  )-----------( 195      ( 22 Jan 2020 05:45 )             42.4     01-22    132<L>  |  100  |  10  ----------------------------<  141<H>  3.7   |  24  |  0.75    Ca    8.9      22 Jan 2020 05:45    TPro  7.1  /  Alb  4.1  /  TBili  0.4  /  DBili  x   /  AST  41<H>  /  ALT  44  /  AlkPhos  86  01-21      CARDIAC MARKERS ( 22 Jan 2020 05:45 )  x     / x     / 103 U/L / x     / 2.0 ng/mL          RADIOLOGY & ADDITIONAL TESTS:    Imaging Personally Reviewed:    Consultant(s) Notes Reviewed:      Care Discussed with Consultants/Other Providers:

## 2020-01-22 NOTE — PROVIDER CONTACT NOTE (OTHER) - SITUATION
Patient complaining of worsening headache 9/10. Patient had received tylenol for headache around 1854. No relief

## 2020-01-22 NOTE — PROVIDER CONTACT NOTE (OTHER) - ASSESSMENT
Patient complained of throbbing headache 9/10 in occipital and parietal area. Patient states headache is different from the headaches he gets at home. VS WNL. No s/s of distress noted. Patient denies visual changes.

## 2020-01-22 NOTE — SBIRT NOTE ADULT - NSSBIRTALCPOSREINDET_GEN_A_CORE
Patient reports no isses with his current alcohol consumption.  Patient states that he drinks a few times a month, having 3-4 drinks each time. Patient states the holidays are the only instances where he will have more than 6 drinks. Patient reports no further issues with his alcohol consumption.

## 2020-01-23 LAB
ALBUMIN SERPL ELPH-MCNC: 3.9 G/DL — SIGNIFICANT CHANGE UP (ref 3.3–5)
ALP SERPL-CCNC: 74 U/L — SIGNIFICANT CHANGE UP (ref 40–120)
ALT FLD-CCNC: 37 U/L — SIGNIFICANT CHANGE UP (ref 10–45)
ANION GAP SERPL CALC-SCNC: 12 MMOL/L — SIGNIFICANT CHANGE UP (ref 5–17)
APTT BLD: 29.5 SEC — SIGNIFICANT CHANGE UP (ref 27.5–36.3)
AST SERPL-CCNC: 25 U/L — SIGNIFICANT CHANGE UP (ref 10–40)
BILIRUB SERPL-MCNC: 0.6 MG/DL — SIGNIFICANT CHANGE UP (ref 0.2–1.2)
BUN SERPL-MCNC: 18 MG/DL — SIGNIFICANT CHANGE UP (ref 7–23)
CALCIUM SERPL-MCNC: 9.2 MG/DL — SIGNIFICANT CHANGE UP (ref 8.4–10.5)
CHLORIDE SERPL-SCNC: 103 MMOL/L — SIGNIFICANT CHANGE UP (ref 96–108)
CO2 SERPL-SCNC: 24 MMOL/L — SIGNIFICANT CHANGE UP (ref 22–31)
CREAT SERPL-MCNC: 0.67 MG/DL — SIGNIFICANT CHANGE UP (ref 0.5–1.3)
GLUCOSE SERPL-MCNC: 105 MG/DL — HIGH (ref 70–99)
HCT VFR BLD CALC: 45.5 % — SIGNIFICANT CHANGE UP (ref 39–50)
HGB BLD-MCNC: 15 G/DL — SIGNIFICANT CHANGE UP (ref 13–17)
INR BLD: 0.97 RATIO — SIGNIFICANT CHANGE UP (ref 0.88–1.16)
MCHC RBC-ENTMCNC: 29.9 PG — SIGNIFICANT CHANGE UP (ref 27–34)
MCHC RBC-ENTMCNC: 33 GM/DL — SIGNIFICANT CHANGE UP (ref 32–36)
MCV RBC AUTO: 90.6 FL — SIGNIFICANT CHANGE UP (ref 80–100)
NRBC # BLD: 0 /100 WBCS — SIGNIFICANT CHANGE UP (ref 0–0)
PLATELET # BLD AUTO: 199 K/UL — SIGNIFICANT CHANGE UP (ref 150–400)
POTASSIUM SERPL-MCNC: 4.2 MMOL/L — SIGNIFICANT CHANGE UP (ref 3.5–5.3)
POTASSIUM SERPL-SCNC: 4.2 MMOL/L — SIGNIFICANT CHANGE UP (ref 3.5–5.3)
PROT SERPL-MCNC: 7.1 G/DL — SIGNIFICANT CHANGE UP (ref 6–8.3)
PROTHROM AB SERPL-ACNC: 11.1 SEC — SIGNIFICANT CHANGE UP (ref 10–12.9)
RBC # BLD: 5.02 M/UL — SIGNIFICANT CHANGE UP (ref 4.2–5.8)
RBC # FLD: 12.6 % — SIGNIFICANT CHANGE UP (ref 10.3–14.5)
SODIUM SERPL-SCNC: 139 MMOL/L — SIGNIFICANT CHANGE UP (ref 135–145)
WBC # BLD: 7.68 K/UL — SIGNIFICANT CHANGE UP (ref 3.8–10.5)
WBC # FLD AUTO: 7.68 K/UL — SIGNIFICANT CHANGE UP (ref 3.8–10.5)

## 2020-01-23 PROCEDURE — 70450 CT HEAD/BRAIN W/O DYE: CPT | Mod: 26

## 2020-01-23 PROCEDURE — 93010 ELECTROCARDIOGRAM REPORT: CPT

## 2020-01-23 RX ORDER — ONDANSETRON 8 MG/1
4 TABLET, FILM COATED ORAL ONCE
Refills: 0 | Status: COMPLETED | OUTPATIENT
Start: 2020-01-23 | End: 2020-01-23

## 2020-01-23 RX ORDER — ASPIRIN/CALCIUM CARB/MAGNESIUM 324 MG
81 TABLET ORAL DAILY
Refills: 0 | Status: DISCONTINUED | OUTPATIENT
Start: 2020-01-23 | End: 2020-01-24

## 2020-01-23 RX ORDER — METOCLOPRAMIDE HCL 10 MG
10 TABLET ORAL ONCE
Refills: 0 | Status: COMPLETED | OUTPATIENT
Start: 2020-01-23 | End: 2020-01-23

## 2020-01-23 RX ORDER — TICAGRELOR 90 MG/1
90 TABLET ORAL
Refills: 0 | Status: DISCONTINUED | OUTPATIENT
Start: 2020-01-23 | End: 2020-01-24

## 2020-01-23 RX ORDER — ACETAMINOPHEN 500 MG
1000 TABLET ORAL ONCE
Refills: 0 | Status: COMPLETED | OUTPATIENT
Start: 2020-01-23 | End: 2020-01-23

## 2020-01-23 RX ORDER — DIVALPROEX SODIUM 500 MG/1
500 TABLET, DELAYED RELEASE ORAL ONCE
Refills: 0 | Status: COMPLETED | OUTPATIENT
Start: 2020-01-23 | End: 2020-01-23

## 2020-01-23 RX ORDER — ACETAMINOPHEN 500 MG
650 TABLET ORAL ONCE
Refills: 0 | Status: COMPLETED | OUTPATIENT
Start: 2020-01-23 | End: 2020-01-23

## 2020-01-23 RX ADMIN — Medication 81 MILLIGRAM(S): at 08:47

## 2020-01-23 RX ADMIN — Medication 25 MILLIGRAM(S): at 05:31

## 2020-01-23 RX ADMIN — ATORVASTATIN CALCIUM 80 MILLIGRAM(S): 80 TABLET, FILM COATED ORAL at 21:22

## 2020-01-23 RX ADMIN — ISOSORBIDE MONONITRATE 30 MILLIGRAM(S): 60 TABLET, EXTENDED RELEASE ORAL at 08:47

## 2020-01-23 RX ADMIN — SODIUM CHLORIDE 3 MILLILITER(S): 9 INJECTION INTRAMUSCULAR; INTRAVENOUS; SUBCUTANEOUS at 07:18

## 2020-01-23 RX ADMIN — Medication 400 MILLIGRAM(S): at 16:54

## 2020-01-23 RX ADMIN — SODIUM CHLORIDE 3 MILLILITER(S): 9 INJECTION INTRAMUSCULAR; INTRAVENOUS; SUBCUTANEOUS at 14:21

## 2020-01-23 RX ADMIN — Medication 650 MILLIGRAM(S): at 14:50

## 2020-01-23 RX ADMIN — Medication 1000 MILLIGRAM(S): at 17:15

## 2020-01-23 RX ADMIN — DIVALPROEX SODIUM 500 MILLIGRAM(S): 500 TABLET, DELAYED RELEASE ORAL at 18:46

## 2020-01-23 RX ADMIN — ONDANSETRON 4 MILLIGRAM(S): 8 TABLET, FILM COATED ORAL at 16:54

## 2020-01-23 RX ADMIN — Medication 10 MILLIGRAM(S): at 18:46

## 2020-01-23 RX ADMIN — Medication 650 MILLIGRAM(S): at 14:20

## 2020-01-23 RX ADMIN — SODIUM CHLORIDE 3 MILLILITER(S): 9 INJECTION INTRAMUSCULAR; INTRAVENOUS; SUBCUTANEOUS at 21:09

## 2020-01-23 RX ADMIN — TICAGRELOR 90 MILLIGRAM(S): 90 TABLET ORAL at 21:22

## 2020-01-23 NOTE — PROGRESS NOTE ADULT - SUBJECTIVE AND OBJECTIVE BOX
SUBJECTIVE / OVERNIGHT EVENTS: pt denies chest pain, shortness of breath   HA reported    MEDICATIONS  (STANDING):  aspirin  chewable 81 milliGRAM(s) Oral daily  atorvastatin 80 milliGRAM(s) Oral at bedtime  enoxaparin Injectable 40 milliGRAM(s) SubCutaneous daily  influenza   Vaccine 0.5 milliLiter(s) IntraMuscular once  isosorbide   mononitrate ER Tablet (IMDUR) 30 milliGRAM(s) Oral daily  metoprolol succinate ER 25 milliGRAM(s) Oral daily  sodium chloride 0.9% lock flush 3 milliLiter(s) IV Push every 8 hours    MEDICATIONS  (PRN):  nitroglycerin     SubLingual 0.4 milliGRAM(s) SubLingual every 5 minutes PRN Chest Pain    Vital Signs Last 24 Hrs  T(C): 36.6 (22 Jan 2020 12:00), Max: 37.3 (21 Jan 2020 20:21)  T(F): 97.9 (22 Jan 2020 12:00), Max: 99.1 (21 Jan 2020 20:21)  HR: 68 (22 Jan 2020 12:00) (55 - 90)  BP: 116/67 (22 Jan 2020 12:00) (94/57 - 145/100)  BP(mean): --  RR: 18 (22 Jan 2020 12:00) (16 - 20)  SpO2: 94% (22 Jan 2020 12:00) (94% - 99%)      CAPILLARY BLOOD GLUCOSE        I&O's Summary    22 Jan 2020 07:01  -  22 Jan 2020 17:46  --------------------------------------------------------  IN: 420 mL / OUT: 0 mL / NET: 420 mL        Constitutional: No fever, fatigue  Skin: No rash.  Eyes: No recent vision problems or eye pain.  ENT: No congestion, ear pain, or sore throat.  Cardiovascular: No chest pain or palpation.  Respiratory: No cough, shortness of breath, congestion, or wheezing.  Gastrointestinal: No abdominal pain, nausea, vomiting, or diarrhea.  Genitourinary: No dysuria.  Musculoskeletal: No joint swelling.  Neurologic: No headache.    PHYSICAL EXAM:  GENERAL: NAD  EYES: EOMI, PERRLA  NECK: Supple, No JVD  CHEST/LUNG: cta ludy  HEART:  S1 , S2 +  ABDOMEN: soft , bs+  EXTREMITIES:no edema    NEUROLOGY:alert awake       LABS:                        14.4   5.40  )-----------( 195      ( 22 Jan 2020 05:45 )             42.4     01-22    132<L>  |  100  |  10  ----------------------------<  141<H>  3.7   |  24  |  0.75    Ca    8.9      22 Jan 2020 05:45    TPro  7.1  /  Alb  4.1  /  TBili  0.4  /  DBili  x   /  AST  41<H>  /  ALT  44  /  AlkPhos  86  01-21      CARDIAC MARKERS ( 22 Jan 2020 05:45 )  x     / x     / 103 U/L / x     / 2.0 ng/mL          RADIOLOGY & ADDITIONAL TESTS:    Imaging Personally Reviewed:    Consultant(s) Notes Reviewed:      Care Discussed with Consultants/Other Providers:

## 2020-01-23 NOTE — CONSULT NOTE ADULT - SUBJECTIVE AND OBJECTIVE BOX
HPI:  Patient is a 49 year old British male with PMHx of HTN, HLD, CAD s/p stent (4 years ago at [facility] with known 60-70% obstructive CAD) who originally presented with 2 days of intermittent left retrosternal stabbing chest pain radiating to L arm and back, lasted about a couple minutes, resolved spontaneously, no association to exertion and not similar to prior cardiac pain (heaviness). Last routine stress test was 7 months ago and reported normal.  Patient has not been taking BP and HLD meds for the past 7 months due to loss of insurance issues, but taking daily ASA 81.  Patient had a syncopal episode 2 months ago while visiting a friend at [facility], followed by nausea/vomiting.  He was hospitalized shortly to "monitor the heart, blood tests x3, and ultrasound of the heart".  Patient had 3 episodes of CP in ED, received NTG SL x3 with resolution of headache. He is s/p L heart cath which was reportedly unremarkable, and afterwards he developed a headache. He has a history of headaches, and received IV tylenol for this one which did not relieve sx. Neuro was consulted for further reccs. CT head was done, unremarkable.     General:  Constitutional: Obese Male, appears stated age, in no apparent distress including pain  Head: Normocephalic & atraumatic.  Neurological (>12):  MS: Awake, alert, oriented to person, place, situation, time. Normal affect. Follows all commands.    Language: Speech is clear, fluent with good repetition & comprehension (able to name objects___)    CNs: PERRLA (R = 3mm, L = 3mm). VFF. EOMI no nystagmus, no diplopia. V1-3 intact to LT/pinprick, well developed masseter muscles b/l. No facial asymmetry b/l, full eye closure strength b/l. Hearing grossly normal (rubbing fingers) b/l. Symmetric palate elevation in midline. Gag reflex deferred. Head turning & shoulder shrug intact b/l. Tongue midline, normal movements, no atrophy.    Fundoscopic: pale w/ sharp discs margins No vascular changes.      Motor: Normal muscle bulk & tone. No noticeable tremor or seizure. No pronator drift.              Deltoid	Biceps	Triceps	Wrist	Finger ABd	   R	5	5	5	5	5		5 	  L	5	5	5	5	5		5    	H-Flex	H-Ext	H-ABd	H-ADd	K-Flex	K-Ext	D-Flex	P-Flex  R	5	5	5	5	5	5	5	5 	   L	5	5	5	5	5	5	5	5	     Sensation: Intact to LT/PP/Temp/Vibration/Position b/l throughout.     Cortical: Extinction on DSS (neglect): none    Reflexes:              Biceps(C5)       BR(C6)     Triceps(C7)               Patellar(L4)    Achilles(S1)    Plantar Resp  R	2	          2	             2		        2		    2		Down   L	2	          2	             2		        2		    2		Down     Coordination: intact rapid-alt movements. No dysmetria to FTN/HTS    Gait: Normal Romberg. No postural instability. Normal stance and tandem gait.     LABORATORY:  CBC                       15.0   7.68  )-----------( 199      ( 23 Jan 2020 06:50 )             45.5     Chem 01-23    139  |  103  |  18  ----------------------------<  105<H>  4.2   |  24  |  0.67    Ca    9.2      23 Jan 2020 06:50    TPro  7.1  /  Alb  3.9  /  TBili  0.6  /  DBili  x   /  AST  25  /  ALT  37  /  AlkPhos  74  01-23    LFTs LIVER FUNCTIONS - ( 23 Jan 2020 06:50 )  Alb: 3.9 g/dL / Pro: 7.1 g/dL / ALK PHOS: 74 U/L / ALT: 37 U/L / AST: 25 U/L / GGT: x           Coagulopathy PT/INR - ( 23 Jan 2020 06:50 )   PT: 11.1 sec;   INR: 0.97 ratio HPI:  Patient is a 49 year old Brazilian male with PMHx of HTN, HLD, CAD s/p stent (4 years ago at [facility] with known 60-70% obstructive CAD) who originally presented with 2 days of intermittent left retrosternal stabbing chest pain radiating to L arm and back, lasted about a couple minutes, resolved spontaneously, no association to exertion and not similar to prior cardiac pain (heaviness). Patient has not been taking BP and HLD meds for the past 7 months due to loss of insurance issues, but taking daily ASA 81.  Patient had 3 episodes of CP in ED, received NTG SL x3 with resolution of chest pain. He is s/p L heart cath which was reportedly unremarkable. Neurology was consulted for headache. He has a history of headaches, and this headache has been present for about 3 days. His headaches are usually much milder and resolve on their own after 10-15 minutes. He has received IV tylenol thusfar which did not relieve sx. CT head was done, unremarkable. The headache is on the crown of his head, and is associated with photophobia, nausea, and vomiting. The headache began as a mild headache but has gotten progressively worse. It is positional, and feels better laying down but the headache doesn't resolve completely. Denies vision changes, dizziness, change in speech, or numbness or weakness.     General:  Constitutional: Obese Male, appears stated age, in no apparent distress including pain  Head: Normocephalic & atraumatic.  Neurological (>12):  MS: Awake, alert, oriented to person, place, situation, time. Normal affect. Follows all commands.  Language: Speech is clear, fluent with good repetition & comprehension    CNs: PERRLA (R = 3mm, L = 3mm). VFF. EOMI no nystagmus, no diplopia. V1-3 intact to LT. No facial asymmetry b/l, full eye closure strength b/l. Symmetric palate elevation in midline. Gag reflex deferred. Head turning & shoulder shrug intact b/l.   No nuchal rigidity.     Motor: Normal muscle bulk & tone. No noticeable tremor. No pronator drift.              Deltoid	Biceps	Triceps	   R	5	5	5	5	  L	5	5	5	5	    	H-Flex	H-Ext	K-Flex	K-Ext	D-Flex	P-Flex  R	5	5	5	5	5	5	   L	5	5	5	5	5	5	   Sensation: Intact to LT b/l throughout.     Reflexes:              Biceps(C5)       BR(C6)     Triceps(C7)               Patellar(L4)    Achilles(S1)    Plantar Resp  R	2	          2	             2		        2		    2		Down   L	2	          2	             2		        2		    2		Down     Coordination: intact rapid-alt movements. No dysmetria to FTN    Gait: deferred, patient is s/p L heart cath.     LABORATORY:  CBC                       15.0   7.68  )-----------( 199      ( 23 Jan 2020 06:50 )             45.5     Chem 01-23    139  |  103  |  18  ----------------------------<  105<H>  4.2   |  24  |  0.67    Ca    9.2      23 Jan 2020 06:50    TPro  7.1  /  Alb  3.9  /  TBili  0.6  /  DBili  x   /  AST  25  /  ALT  37  /  AlkPhos  74  01-23    LFTs LIVER FUNCTIONS - ( 23 Jan 2020 06:50 )  Alb: 3.9 g/dL / Pro: 7.1 g/dL / ALK PHOS: 74 U/L / ALT: 37 U/L / AST: 25 U/L / GGT: x           Coagulopathy PT/INR - ( 23 Jan 2020 06:50 )   PT: 11.1 sec;   INR: 0.97 ratio

## 2020-01-23 NOTE — PROGRESS NOTE ADULT - SUBJECTIVE AND OBJECTIVE BOX
Rodney Gillespie MD  Interventional Cardiology / Endovascular Specialist  [place] Office : [phone] [place] [address] [place] [address]  Tel:   [place] Office : [address] [facility] [place] [address]  Tel: [phone]  Cell : [phone]    HISTORY OF PRESENTING ILLNESS:    Pt is lying in bed comfortable not in distress, no chest pains no SOB no palpitations  	  MEDICATIONS:  aspirin enteric coated 81 milliGRAM(s) Oral daily  enoxaparin Injectable 40 milliGRAM(s) SubCutaneous daily  isosorbide   mononitrate ER Tablet (IMDUR) 30 milliGRAM(s) Oral daily  metoprolol succinate ER 25 milliGRAM(s) Oral daily  ticagrelor 90 milliGRAM(s) Oral two times a day  diVALproex  milliGRAM(s) Oral once  metoclopramide Injectable 10 milliGRAM(s) IV Push once  atorvastatin 80 milliGRAM(s) Oral at bedtime  influenza   Vaccine 0.5 milliLiter(s) IntraMuscular once  sodium chloride 0.9% lock flush 3 milliLiter(s) IV Push every 8 hours      PAST MEDICAL/SURGICAL HISTORY  PAST MEDICAL & SURGICAL HISTORY:  CAD (coronary artery disease)  HLD (hyperlipidemia)  HTN (hypertension)  S/P appendectomy      SOCIAL HISTORY: Substance Use (street drugs): ( x ) never used  (  ) other:    FAMILY HISTORY:  FH: CAD (coronary artery disease): Father s/p stent at 71 y/o age  FH: hypertension: Mother  FH: diabetes mellitus: Mother      REVIEW OF SYSTEMS:  CONSTITUTIONAL: No fever, weight loss, or fatigue  EYES: No eye pain, visual disturbances, or discharge  ENMT:  No difficulty hearing, tinnitus, vertigo; No sinus or throat pain  BREASTS: No pain, masses, or nipple discharge  GASTROINTESTINAL: No abdominal or epigastric pain. No nausea, vomiting, or hematemesis; No diarrhea or constipation. No melena or hematochezia.  GENITOURINARY: No dysuria, frequency, hematuria, or incontinence  NEUROLOGICAL: No headaches, memory loss, loss of strength, numbness, or tremors  ENDOCRINE: No heat or cold intolerance; No hair loss  MUSCULOSKELETAL: No joint pain or swelling; No muscle, back, or extremity pain  PSYCHIATRIC: No depression, anxiety, mood swings, or difficulty sleeping  HEME/LYMPH: No easy bruising, or bleeding gums  All others negative    PHYSICAL EXAM:  T(C): 36.2 (01-23-20 @ 14:14), Max: 36.9 (01-22-20 @ 19:55)  HR: 54 (01-23-20 @ 16:50) (49 - 65)  BP: 117/75 (01-23-20 @ 16:50) (117/71 - 137/82)  RR: 16 (01-23-20 @ 14:14) (16 - 18)  SpO2: 97% (01-23-20 @ 14:14) (96% - 97%)  Wt(kg): --  I&O's Summary    22 Jan 2020 07:01  -  23 Jan 2020 07:00  --------------------------------------------------------  IN: 540 mL / OUT: 0 mL / NET: 540 mL    23 Jan 2020 07:01  -  23 Jan 2020 18:28  --------------------------------------------------------  IN: 0 mL / OUT: 0 mL / NET: 0 mL          GENERAL: NAD, well-groomed, well-developed  EYES: EOMI, PERRLA, conjunctiva and sclera clear  ENMT: No tonsillar erythema, exudates, or enlargement; Moist mucous membranes, Good dentition, No lesions  Cardiovascular: Normal S1 S2, No JVD, No murmurs, No edema  Respiratory: Lungs clear to auscultation	  Gastrointestinal:  Soft, Non-tender, + BS	  Extremities: Normal range of motion, No clubbing, cyanosis or edema  LYMPH: No lymphadenopathy noted  NERVOUS SYSTEM:  Alert & Oriented X3, Good concentration; Motor Strength 5/5 B/L upper and lower extremities; DTRs 2+ intact and symmetric                                    15.0   7.68  )-----------( 199      ( 23 Jan 2020 06:50 )             45.5     01-23    139  |  103  |  18  ----------------------------<  105<H>  4.2   |  24  |  0.67    Ca    9.2      23 Jan 2020 06:50    TPro  7.1  /  Alb  3.9  /  TBili  0.6  /  DBili  x   /  AST  25  /  ALT  37  /  AlkPhos  74  01-23    proBNP:   Lipid Profile:   HgA1c:   TSH:     Consultant(s) Notes Reviewed:  [x ] YES  [ ] NO    Care Discussed with Consultants/Other Providers [ x] YES  [ ] NO    Imaging Personally Reviewed independently:  [x] YES  [ ] NO    All labs, radiologic studies, vitals, orders and medications list reviewed. Patient is seen and examined at bedside. Case discussed with medical team.

## 2020-01-23 NOTE — PROGRESS NOTE ADULT - ASSESSMENT
EKG SR     Echo pending     Assessment and Plan    1) CP: Pt has a history of CAD s/p MI and PCI 4 years ago at [facility], was told that he has residual 60-70% stenosis CP improved with nitro in the ER, also wife states he had an episode of syncope 1 month ago. EKG non ischemic , 1st  set of trops -chaz,  Cath done  PAGAN reported, unable to use triptans for migraine    2) HTN: On metoprolol 25 mg po daily     3) DVT PPX recommend Lovenox

## 2020-01-23 NOTE — PROGRESS NOTE ADULT - ASSESSMENT
EKG SR     Echo < from: Transthoracic Echocardiogram (01.22.20 @ 05:12) >  1. Normal left ventricular internal dimensions and wall  thicknesses.  2. Normal left ventricular systolic function.    < end of copied text >    Assessment and Plan    1) CAD s/p LAD PCI:  presented with symptoms c/w Unstable angina, S/P LHC , IFR of LAD 0.9, left dominant system s/p Distal LCX PCI with CORI , non dominant RCA with diffuse moderate ds , agressive risk factor modification c/w asa/ Brilinta , lipitor 80     2) HTN: metoprolol 25 mg po daily , IMDUR     3) DVT PPX  Lovenox

## 2020-01-23 NOTE — CONSULT NOTE ADULT - ASSESSMENT
PENDED NOTE 49 year old [place] male who originally presented with 2 days of intermittent left retrosternal stabbing chest pain radiating to L arm and back, lasted about a couple minutes, resolved spontaneously. He is s/p L heart cath which was reportedly unremarkable. Neurology was consulted for headache. He has a history of headaches, and this headache has been present for about 3 days. His headaches are usually much milder and resolve on their own after 10-15 minutes. He has received IV tylenol thusfar which did not relieve sx. CT head was done, unremarkable. The headache is on the crown of his head, and is associated with photophobia, nausea, and vomiting. The headache began as a mild headache but has gotten progressively worse. It is positional, and feels better laying down but the headache doesn't resolve completely. Denies vision changes, dizziness, change in speech, or numbness or weakness.   Impression: likely migranous headache    Plan  [] recommend reglan 10 (check EKG fr qtc prolongation), benadryl 25 IV q6, depakote 500 once, IV tylenol (1g up to 4x daily), and toradol 30 IV once OK from cardiac standpoint.   [] will consider more imaging and/or further evaluation if headache doesn't resolve from pain meds  [] DHE contraindicated due to CAD.  [] rest of management per primary team.

## 2020-01-23 NOTE — PROVIDER CONTACT NOTE (OTHER) - ASSESSMENT
INR is 2.9 with goal of 2.0 to 3.0. INR therapeutic.     Dose verified with patient.  Per protocol, warfarin dose remains the same with INR recheck in 4 weeks.   Patient informed of the following information: INR result, current warfarin dose and due date of follow up INR.  Also instructed to call with any changes in diet, medication, or health status or bleeding/bruising concerns.  Refused to make next INR appointment made.    On site provider Dr. Lee     Pt stable. VSS. Pt states headache is on top of head. No c/o dizziness or blurry vission.

## 2020-01-23 NOTE — CONSULT NOTE ADULT - ATTENDING COMMENTS
48 y/o man p/w 3 days of stabbing chest pain and headache. Pt had heart cath which was negative. Pt has hx of headaches but reports that they are usually right sided only, no associated N/V, photophobia, and less intense. He started having headache after onset of the chest pain and was centered mostly in the back, with episode of vomiting and accompanied by photophobia.   Pt received cocktail of reglan and Toradol. Feeling better today, headache completely resolved.     Neurological exam unremarkable     Pt with headache, migraine vs cardiogenic headache.   No further workup. If pt has headache again can give same as above. Any change in neurological status, get repeat CTH.   Pt should follow up with neurology [phone]

## 2020-01-24 VITALS — SYSTOLIC BLOOD PRESSURE: 126 MMHG | HEART RATE: 62 BPM | DIASTOLIC BLOOD PRESSURE: 73 MMHG

## 2020-01-24 LAB
ANION GAP SERPL CALC-SCNC: 15 MMOL/L — SIGNIFICANT CHANGE UP (ref 5–17)
BUN SERPL-MCNC: 15 MG/DL — SIGNIFICANT CHANGE UP (ref 7–23)
CALCIUM SERPL-MCNC: 9.4 MG/DL — SIGNIFICANT CHANGE UP (ref 8.4–10.5)
CHLORIDE SERPL-SCNC: 99 MMOL/L — SIGNIFICANT CHANGE UP (ref 96–108)
CO2 SERPL-SCNC: 23 MMOL/L — SIGNIFICANT CHANGE UP (ref 22–31)
CREAT SERPL-MCNC: 0.62 MG/DL — SIGNIFICANT CHANGE UP (ref 0.5–1.3)
GLUCOSE SERPL-MCNC: 97 MG/DL — SIGNIFICANT CHANGE UP (ref 70–99)
POTASSIUM SERPL-MCNC: 3.6 MMOL/L — SIGNIFICANT CHANGE UP (ref 3.5–5.3)
POTASSIUM SERPL-SCNC: 3.6 MMOL/L — SIGNIFICANT CHANGE UP (ref 3.5–5.3)
SODIUM SERPL-SCNC: 137 MMOL/L — SIGNIFICANT CHANGE UP (ref 135–145)

## 2020-01-24 PROCEDURE — 80048 BASIC METABOLIC PNL TOTAL CA: CPT

## 2020-01-24 PROCEDURE — C1769: CPT

## 2020-01-24 PROCEDURE — C1725: CPT

## 2020-01-24 PROCEDURE — 93458 L HRT ARTERY/VENTRICLE ANGIO: CPT | Mod: 59

## 2020-01-24 PROCEDURE — 99221 1ST HOSP IP/OBS SF/LOW 40: CPT | Mod: GC

## 2020-01-24 PROCEDURE — 85730 THROMBOPLASTIN TIME PARTIAL: CPT

## 2020-01-24 PROCEDURE — 99285 EMERGENCY DEPT VISIT HI MDM: CPT | Mod: 25

## 2020-01-24 PROCEDURE — 99152 MOD SED SAME PHYS/QHP 5/>YRS: CPT

## 2020-01-24 PROCEDURE — 85610 PROTHROMBIN TIME: CPT

## 2020-01-24 PROCEDURE — 80053 COMPREHEN METABOLIC PANEL: CPT

## 2020-01-24 PROCEDURE — C1887: CPT

## 2020-01-24 PROCEDURE — 93005 ELECTROCARDIOGRAM TRACING: CPT

## 2020-01-24 PROCEDURE — C1894: CPT

## 2020-01-24 PROCEDURE — 93306 TTE W/DOPPLER COMPLETE: CPT

## 2020-01-24 PROCEDURE — 93571 IV DOP VEL&/PRESS C FLO 1ST: CPT | Mod: LC

## 2020-01-24 PROCEDURE — C9600: CPT | Mod: LC

## 2020-01-24 PROCEDURE — 70450 CT HEAD/BRAIN W/O DYE: CPT

## 2020-01-24 PROCEDURE — 71046 X-RAY EXAM CHEST 2 VIEWS: CPT

## 2020-01-24 PROCEDURE — 83880 ASSAY OF NATRIURETIC PEPTIDE: CPT

## 2020-01-24 PROCEDURE — C1874: CPT

## 2020-01-24 PROCEDURE — 82550 ASSAY OF CK (CPK): CPT

## 2020-01-24 PROCEDURE — 82553 CREATINE MB FRACTION: CPT

## 2020-01-24 PROCEDURE — 80061 LIPID PANEL: CPT

## 2020-01-24 PROCEDURE — 99153 MOD SED SAME PHYS/QHP EA: CPT

## 2020-01-24 PROCEDURE — 85027 COMPLETE CBC AUTOMATED: CPT

## 2020-01-24 PROCEDURE — 85379 FIBRIN DEGRADATION QUANT: CPT

## 2020-01-24 PROCEDURE — 99238 HOSP IP/OBS DSCHRG MGMT 30/<: CPT

## 2020-01-24 PROCEDURE — 83036 HEMOGLOBIN GLYCOSYLATED A1C: CPT

## 2020-01-24 PROCEDURE — 84484 ASSAY OF TROPONIN QUANT: CPT

## 2020-01-24 RX ORDER — METOPROLOL TARTRATE 50 MG
12.5 TABLET ORAL DAILY
Refills: 0 | Status: DISCONTINUED | OUTPATIENT
Start: 2020-01-24 | End: 2020-01-24

## 2020-01-24 RX ORDER — TICAGRELOR 90 MG/1
1 TABLET ORAL
Qty: 60 | Refills: 0
Start: 2020-01-24 | End: 2020-02-22

## 2020-01-24 RX ORDER — ASPIRIN/CALCIUM CARB/MAGNESIUM 324 MG
1 TABLET ORAL
Qty: 30 | Refills: 0
Start: 2020-01-24 | End: 2020-02-22

## 2020-01-24 RX ORDER — ISOSORBIDE MONONITRATE 60 MG/1
1 TABLET, EXTENDED RELEASE ORAL
Qty: 30 | Refills: 0
Start: 2020-01-24 | End: 2020-02-22

## 2020-01-24 RX ORDER — ASPIRIN/CALCIUM CARB/MAGNESIUM 324 MG
1 TABLET ORAL
Qty: 0 | Refills: 0 | DISCHARGE

## 2020-01-24 RX ORDER — ATORVASTATIN CALCIUM 80 MG/1
1 TABLET, FILM COATED ORAL
Qty: 30 | Refills: 0
Start: 2020-01-24 | End: 2020-02-22

## 2020-01-24 RX ORDER — METOPROLOL TARTRATE 50 MG
0.5 TABLET ORAL
Qty: 15 | Refills: 0
Start: 2020-01-24 | End: 2020-02-22

## 2020-01-24 RX ADMIN — ENOXAPARIN SODIUM 40 MILLIGRAM(S): 100 INJECTION SUBCUTANEOUS at 11:11

## 2020-01-24 RX ADMIN — Medication 81 MILLIGRAM(S): at 11:11

## 2020-01-24 RX ADMIN — SODIUM CHLORIDE 3 MILLILITER(S): 9 INJECTION INTRAMUSCULAR; INTRAVENOUS; SUBCUTANEOUS at 05:02

## 2020-01-24 RX ADMIN — Medication 12.5 MILLIGRAM(S): at 07:06

## 2020-01-24 RX ADMIN — ISOSORBIDE MONONITRATE 30 MILLIGRAM(S): 60 TABLET, EXTENDED RELEASE ORAL at 11:11

## 2020-01-24 RX ADMIN — TICAGRELOR 90 MILLIGRAM(S): 90 TABLET ORAL at 09:08

## 2020-01-24 NOTE — DISCHARGE NOTE NURSING/CASE MANAGEMENT/SOCIAL WORK - PATIENT PORTAL LINK FT
You can access the FollowMyHealth Patient Portal offered by [facility] by registering at the following website: [email or web address]. By joining [employer]’s FollowMyHealth portal, you will also be able to view your health information using other applications (apps) compatible with our system.

## 2020-01-24 NOTE — DISCHARGE NOTE PROVIDER - NSDCMRMEDTOKEN_GEN_ALL_CORE_FT
aspirin 81 mg oral delayed release tablet: 1 tab(s) orally once a day  atorvastatin 80 mg oral tablet: 1 tab(s) orally once a day (at bedtime)  isosorbide mononitrate 30 mg oral tablet, extended release: 1 tab(s) orally once a day  ticagrelor 90 mg oral tablet: 1 tab(s) orally 2 times a day  Toprol-XL 25 mg oral tablet, extended release: 0.5 tab(s) orally once a day

## 2020-01-24 NOTE — DISCHARGE NOTE PROVIDER - HOSPITAL COURSE
Pt has a history of CAD s/p MI and PCI 4 years ago at [facility], was told that he has residual 60-70% stenosis CP improved with nitro in the ER    Pt s/p cath stented to Distal circumflex     HA  resolved  with s/p Reglan and Depakote  CT head negative     will have him follow up with Neurology and  Cardiology as outpatient for

## 2020-01-24 NOTE — DISCHARGE NOTE PROVIDER - NSDCCPCAREPLAN_GEN_ALL_CORE_FT
PRINCIPAL DISCHARGE DIAGNOSIS  Diagnosis: Coronary artery disease  Assessment and Plan of Treatment: Coronary artery disease is a condition where the arteries the supply the heart muscle get clogges with fatty deposits & puts you at risk for a heart attack  Call your doctor if you have any new pain, pressure, or discomfort in the center of your chest, pain, tingling or discomfort in arms, back, neck, jaw, or stomach, shortness of breath, nausea, vomiting, burping or heartburn, sweating, cold and clammy skin, racing or abnormal heartbeat for more than 10 minutes or if they keep coming & going.  Call 911 and do not tr to get to hospital by care  You can help yourself with lefestyle changes (quitting smoking if you smoke), eat lots of fruits & vegetables & low fat dairy products, not a lot of meat & fatty foods, walk or some form of physical activity most days of the week, lose weight if you are overweight  Take your cardiac medication as prescribed to lower cholesterol, to lower blood pressure, aspirin to prevent blood clots, and diabetes control  Make sure to keep appointments with doctor for cardiac follow up care        SECONDARY DISCHARGE DIAGNOSES  Diagnosis: HTN (hypertension)  Assessment and Plan of Treatment: Follow up with your medical doctor to establish long term blood pressure treatment goals.

## 2020-01-24 NOTE — CHART NOTE - NSCHARTNOTEFT_GEN_A_CORE
Notified by RN patient with brief episode of bradycardia to HR of 39. Pt now with HR in 50s, baseline in 50s as well. Pt seen and evaluated at bedside. Currently asymptomatic.  Will reschedule AM metoprolol dose.  Will endorse to cards in AM.  Will continue to follow and assess.    -Umu Braun PA-C, [id number], Dept of Medicine
called to evaluate  Pt with HA  and  Nausea  9/10  , post cath   seen and examined  appeared to have sensitivity to Light , and Vomiting  no c/o dizziness , chest pain .  Pt  with B/L UE  and LE 5/5  Strength    Ct head  with no hemorrhage or masses   d/w DR miranda Neuro consult  pending form House   spoke with Neurology resident , will  add  Reglan and  Depakote  for symptom management   Also Noted small Hematoma on  R  Radial cath site access , applied compression  for ~10 min area now soft , pressure dressing in Place   will sign out to night covering team   stella weston [id number]

## 2020-01-24 NOTE — DISCHARGE NOTE PROVIDER - CARE PROVIDER_API CALL
Rodney Gillespie)  Cardiovascular Disease; Internal Medicine  [address] [place] [address]  Phone: [phone]  Fax: [phone]  Follow Up Time:     Rosie Awad (NP; RN)  NP in Family Health  [address] [place] [address], [facility] [address]  [place] [address]  Phone: [phone]  Fax: [phone]  Follow Up Time:

## 2020-01-26 ENCOUNTER — EMERGENCY (EMERGENCY)
Facility: HOSPITAL | Age: 50
LOS: 1 days | End: 2020-01-26
Attending: EMERGENCY MEDICINE
Payer: COMMERCIAL

## 2020-01-26 VITALS
DIASTOLIC BLOOD PRESSURE: 82 MMHG | TEMPERATURE: 97 F | HEART RATE: 56 BPM | WEIGHT: 184.97 LBS | HEIGHT: 69 IN | SYSTOLIC BLOOD PRESSURE: 138 MMHG | RESPIRATION RATE: 18 BRPM

## 2020-01-26 VITALS
OXYGEN SATURATION: 98 % | HEART RATE: 53 BPM | SYSTOLIC BLOOD PRESSURE: 145 MMHG | RESPIRATION RATE: 16 BRPM | DIASTOLIC BLOOD PRESSURE: 81 MMHG

## 2020-01-26 DIAGNOSIS — Z90.49 ACQUIRED ABSENCE OF OTHER SPECIFIED PARTS OF DIGESTIVE TRACT: Chronic | ICD-10-CM

## 2020-01-26 DIAGNOSIS — Z95.5 PRESENCE OF CORONARY ANGIOPLASTY IMPLANT AND GRAFT: Chronic | ICD-10-CM

## 2020-01-26 PROCEDURE — 96375 TX/PRO/DX INJ NEW DRUG ADDON: CPT

## 2020-01-26 PROCEDURE — 99284 EMERGENCY DEPT VISIT MOD MDM: CPT

## 2020-01-26 PROCEDURE — 96365 THER/PROPH/DIAG IV INF INIT: CPT

## 2020-01-26 PROCEDURE — 99284 EMERGENCY DEPT VISIT MOD MDM: CPT | Mod: 25

## 2020-01-26 RX ORDER — METOCLOPRAMIDE HCL 10 MG
10 TABLET ORAL ONCE
Refills: 0 | Status: COMPLETED | OUTPATIENT
Start: 2020-01-26 | End: 2020-01-26

## 2020-01-26 RX ORDER — SODIUM CHLORIDE 9 MG/ML
1000 INJECTION INTRAMUSCULAR; INTRAVENOUS; SUBCUTANEOUS ONCE
Refills: 0 | Status: DISCONTINUED | OUTPATIENT
Start: 2020-01-26 | End: 2020-02-06

## 2020-01-26 RX ORDER — KETOROLAC TROMETHAMINE 30 MG/ML
15 SYRINGE (ML) INJECTION ONCE
Refills: 0 | Status: DISCONTINUED | OUTPATIENT
Start: 2020-01-26 | End: 2020-01-26

## 2020-01-26 RX ORDER — DIPHENHYDRAMINE HCL 50 MG
25 CAPSULE ORAL ONCE
Refills: 0 | Status: COMPLETED | OUTPATIENT
Start: 2020-01-26 | End: 2020-01-26

## 2020-01-26 RX ORDER — DEXAMETHASONE 0.5 MG/5ML
10 ELIXIR ORAL ONCE
Refills: 0 | Status: COMPLETED | OUTPATIENT
Start: 2020-01-26 | End: 2020-01-26

## 2020-01-26 RX ORDER — SODIUM CHLORIDE 9 MG/ML
1000 INJECTION INTRAMUSCULAR; INTRAVENOUS; SUBCUTANEOUS ONCE
Refills: 0 | Status: COMPLETED | OUTPATIENT
Start: 2020-01-26 | End: 2020-01-26

## 2020-01-26 RX ADMIN — SODIUM CHLORIDE 1000 MILLILITER(S): 9 INJECTION INTRAMUSCULAR; INTRAVENOUS; SUBCUTANEOUS at 17:40

## 2020-01-26 RX ADMIN — Medication 102 MILLIGRAM(S): at 17:52

## 2020-01-26 RX ADMIN — Medication 15 MILLIGRAM(S): at 17:54

## 2020-01-26 RX ADMIN — Medication 10 MILLIGRAM(S): at 17:45

## 2020-01-26 RX ADMIN — Medication 15 MILLIGRAM(S): at 18:16

## 2020-01-26 RX ADMIN — Medication 10 MILLIGRAM(S): at 18:16

## 2020-01-26 RX ADMIN — Medication 25 MILLIGRAM(S): at 18:16

## 2020-01-26 NOTE — ED PROVIDER NOTE - ATTENDING CONTRIBUTION TO CARE
patient with headache secondary to NO and on reglan and depakote, patient has been controlling headaches with Tylenol but it as come back. No n/v/f/c/weakness.   CN 2-12 intact, normal coordination, normal finger to nose, normal heel to shin, negative Romberg, no pronator drift, no gait instability, 5/5 strength in upper and lower extremities, normal sensory throughout, alert and oriented to person, place, time, and situation.  no acute distress  non-tachycardic, non-tachypneic   bilateral breath sounds   no edema  Hammad Stern MD, FACEP: In this physician's medical judgement based on clinical history and physical exam, presents with uncomplicated headache, will get iv, reglan, toradol, analgesia prn and reassess.   Will follow up on analgesia, reassess and disposition as clinically indicated.   patient encouraged to Follow up with neurology. patient with headache secondary to NO and on reglan and depakote, patient has been controlling headaches with Tylenol but it as come back. No n/v/f/c/weakness.   CN 2-12 intact, normal coordination, normal finger to nose, normal heel to shin, negative Romberg, no pronator drift, no gait instability, 5/5 strength in upper and lower extremities, normal sensory throughout, alert and oriented to person, place, time, and situation.  no acute distress  non-tachycardic, non-tachypneic   bilateral breath sounds   no edema  Hammad Stern MD, FACEP: In this physician's medical judgement based on clinical history and physical exam, presents with uncomplicated headache, will get iv, reglan, toradol, analgesia prn and reassess.   Will follow up on analgesia, reassess and disposition as clinically indicated.   patient encouraged to Follow up with neurology.    Patient serially evaluated throughout emergency department course. There was no acute deterioration up to this time in the department. Patient has demonstrated marked clinical improvement, feels better at this time according to emergency department team. Agree with goals/plan of emergency department care as described in electronic medical record, including diagnostics, therapeutics and consultation as clinically warranted. Will discharge home with close outpatient follow up with primary care physician/provider and specialist if necessary. Patient educated on concerning signs and features to return to the emergency department, in layman terms, including but not limited to: nausea, vomiting, fever, chills, persistent/worsening symptoms or any concerns at all. No immediate life threatening issues present on history or clinical exam. Patient is a safe disposition home, has capacity and insight into their condition, is ambulatory in the Emergency Department with no further questions and will follow up with their doctor(s) this week. Patient understands anticipatory guidance and was given strict return and follow up precautions. The patient has been informed, in layman terms, of all concerning signs and symptoms to return to Emergency Department, the necessity to follow up with the PMD/Clinic/follow up provided within 2-3 days was explained, and the patient reports understanding of above with capacity and insight.

## 2020-01-26 NOTE — ED PROVIDER NOTE - NS ED ROS FT
GENERAL: No fever or chills  EYES: no change in vision  HEENT: no trouble swallowing or speaking  CARDIAC: no chest pain or palpitations  PULMONARY: no cough or SOB  GI: no abdominal pain, nausea, vomiting, diarrhea, or constipation   : No changes in urination  SKIN: no rashes  NEURO: headache  MSK: no joint pain

## 2020-01-26 NOTE — ED PROVIDER NOTE - NSFOLLOWUPCLINICS_GEN_ALL_ED_FT
[facility]  Neurology  [address] [place] [address] [address]rd [place] [address]  Phone: [phone]  Fax:   Follow Up Time: 1-3 Days

## 2020-01-26 NOTE — ED PROVIDER NOTE - OBJECTIVE STATEMENT
49M with pmh HTN, HLD, CAD s/p stents recent admission for chest pain s/p distal circ stent presenting today with headache x 4-5 days. States headache began after taking sublingual nitro for chest pain during admission. States headache was persistent during admission, neuro consulted, CT head negative, given reglan/depakote with improvement. States headache mild but present at discharge. Has been worsening since. Denies fever, chills, cp, sob, n/v/d, dizziness, weakness, dysuria

## 2020-01-26 NOTE — ED PROVIDER NOTE - NSFOLLOWUPINSTRUCTIONS_ED_ALL_ED_FT
Follow up with your neurologist in 2-3 days or call our clinic at [phone].     Follow up with your medical doctor in 2-3 days or call our clinic at [phone] and state you were seen in the Emergency Department and would like to be seen in clinic. You may also call [phone] to speak with a representative to assist follow up care with medicine, surgery, or specialists.    Take Tylenol/acetaminophen 1 g every six hours and supplement (if allowed by your physician) with ibuprofen 600 mg, with food or milk/maalox, every six hours which can be taken three hours apart from the Tylenol to have a layered effect.     Be sure to take no more than 4000mg or 4g of Tylenol/acetaminophen in a 24 hour period. Be sure to check your other medications to see if they include Tylenol/acetaminophen and include them in your calculations to ensure you do not take more than 4000mg or 4g of Tylenol/acetaminophen a day.    Drink at least 2 Liters or 64 Ounces of water each day (UNLESS you are supposed to restrict fluids or have a history of congestive heart failure (CHF)).    Return for any persistent, worsening symptoms, or ANY concerns at all.

## 2020-01-26 NOTE — ED PROVIDER NOTE - CLINICAL SUMMARY MEDICAL DECISION MAKING FREE TEXT BOX
Patient presenting persistent headache since sublingual nitro during prior admission. Will treat symptomatically. No neuro deficits, benign exam. Will refer to neuro Patient presenting persistent headache since sublingual nitro during prior admission. Will treat symptomatically. No neuro deficits, benign exam. Will refer to neuro, recent cth within normal limits   Will follow up on analgesia, reassess and disposition as clinically indicated.

## 2020-01-26 NOTE — ED ADULT NURSE NOTE - NSIMPLEMENTINTERV_GEN_ALL_ED
Implemented All Universal Safety Interventions:  Helper to call system. Call bell, personal items and telephone within reach. Instruct patient to call for assistance. Room bathroom lighting operational. Non-slip footwear when patient is off stretcher. Physically safe environment: no spills, clutter or unnecessary equipment. Stretcher in lowest position, wheels locked, appropriate side rails in place.

## 2020-01-26 NOTE — ED PROVIDER NOTE - PATIENT PORTAL LINK FT
You can access the FollowMyHealth Patient Portal offered by [facility] by registering at the following website: [email or web address]. By joining [employer]’s FollowMyHealth portal, you will also be able to view your health information using other applications (apps) compatible with our system.

## 2020-01-26 NOTE — ED ADULT NURSE NOTE - OBJECTIVE STATEMENT
49 year old male, a+ox3, presents to ED with wife stating 9/10 headache that started at 13:00 today. Pt stated gradual onset originating in the back of his head and radiating up through the head. Took tylenol 650mg with no relief of headache and now presents to the ED in severe pain. pt presented to ED on 1/21/20 was admitted to cardiology and one stent placed via right radial site. Denies chest pain, sob, vomiting, dizziness, coughing, or fevers. Presents with headache no visual changes. denies injury / trauma/ or any thing that would have precipitated his headache. PERRL 4mm bilaterally, eom intact. No neck pain, moves all extremities x4, skin w/d/i. will cont to monitor.

## 2020-01-26 NOTE — ED ADULT NURSE NOTE - PSH
H/O heart artery stent  total of 2 stents to heart . last stent was placed 1/21/20  S/P appendectomy

## 2020-01-26 NOTE — ED PROVIDER NOTE - PHYSICAL EXAMINATION
GEN: appears uncomfortable from headache, awake, well appearing  HEENT: NCAT, MMM, normal conjunctiva, perrl  CHEST/LUNGS: Non-tachypneic, CTAB, bilateral breath sounds  CARDIAC: Non-tachycardic, s1s2, normal perfusion, no peripheral edema  ABDOMEN: Soft, NTND, No rebound/guarding  MSK: No joint tenderness, no gross deformity of extremities  SKIN: No rashes, no petechiae, no vesicles  NEURO: CN grossly intact, normal coordination, no focal motor or sensory deficits  PSYCH: Alert, appropriate, cooperative

## 2020-01-27 PROBLEM — E78.5 HYPERLIPIDEMIA, UNSPECIFIED: Chronic | Status: ACTIVE | Noted: 2020-01-21

## 2020-01-27 PROBLEM — I25.10 ATHEROSCLEROTIC HEART DISEASE OF NATIVE CORONARY ARTERY WITHOUT ANGINA PECTORIS: Chronic | Status: ACTIVE | Noted: 2020-01-21

## 2020-01-27 PROBLEM — I10 ESSENTIAL (PRIMARY) HYPERTENSION: Chronic | Status: ACTIVE | Noted: 2020-01-21

## 2020-01-28 ENCOUNTER — EMERGENCY (EMERGENCY)
Facility: HOSPITAL | Age: 50
LOS: 1 days | End: 2020-01-28
Attending: EMERGENCY MEDICINE
Payer: COMMERCIAL

## 2020-01-28 VITALS
OXYGEN SATURATION: 96 % | HEART RATE: 60 BPM | TEMPERATURE: 98 F | SYSTOLIC BLOOD PRESSURE: 126 MMHG | DIASTOLIC BLOOD PRESSURE: 84 MMHG | RESPIRATION RATE: 16 BRPM | WEIGHT: 190.04 LBS | HEIGHT: 69 IN

## 2020-01-28 VITALS
DIASTOLIC BLOOD PRESSURE: 71 MMHG | HEART RATE: 56 BPM | SYSTOLIC BLOOD PRESSURE: 131 MMHG | OXYGEN SATURATION: 96 % | TEMPERATURE: 98 F | RESPIRATION RATE: 18 BRPM

## 2020-01-28 DIAGNOSIS — Z90.49 ACQUIRED ABSENCE OF OTHER SPECIFIED PARTS OF DIGESTIVE TRACT: Chronic | ICD-10-CM

## 2020-01-28 DIAGNOSIS — Z95.5 PRESENCE OF CORONARY ANGIOPLASTY IMPLANT AND GRAFT: Chronic | ICD-10-CM

## 2020-01-28 PROBLEM — I21.9 ACUTE MYOCARDIAL INFARCTION, UNSPECIFIED: Chronic | Status: ACTIVE | Noted: 2020-01-26

## 2020-01-28 LAB
ALBUMIN SERPL ELPH-MCNC: 4.2 G/DL — SIGNIFICANT CHANGE UP (ref 3.3–5)
ALP SERPL-CCNC: 91 U/L — SIGNIFICANT CHANGE UP (ref 40–120)
ALT FLD-CCNC: 52 U/L — HIGH (ref 10–45)
ANION GAP SERPL CALC-SCNC: 11 MMOL/L — SIGNIFICANT CHANGE UP (ref 5–17)
AST SERPL-CCNC: 27 U/L — SIGNIFICANT CHANGE UP (ref 10–40)
BASOPHILS # BLD AUTO: 0.04 K/UL — SIGNIFICANT CHANGE UP (ref 0–0.2)
BASOPHILS NFR BLD AUTO: 0.4 % — SIGNIFICANT CHANGE UP (ref 0–2)
BILIRUB SERPL-MCNC: 0.4 MG/DL — SIGNIFICANT CHANGE UP (ref 0.2–1.2)
BUN SERPL-MCNC: 18 MG/DL — SIGNIFICANT CHANGE UP (ref 7–23)
CALCIUM SERPL-MCNC: 9.7 MG/DL — SIGNIFICANT CHANGE UP (ref 8.4–10.5)
CHLORIDE SERPL-SCNC: 100 MMOL/L — SIGNIFICANT CHANGE UP (ref 96–108)
CO2 SERPL-SCNC: 23 MMOL/L — SIGNIFICANT CHANGE UP (ref 22–31)
CREAT SERPL-MCNC: 0.84 MG/DL — SIGNIFICANT CHANGE UP (ref 0.5–1.3)
EOSINOPHIL # BLD AUTO: 0.1 K/UL — SIGNIFICANT CHANGE UP (ref 0–0.5)
EOSINOPHIL NFR BLD AUTO: 0.9 % — SIGNIFICANT CHANGE UP (ref 0–6)
GLUCOSE SERPL-MCNC: 102 MG/DL — HIGH (ref 70–99)
HCT VFR BLD CALC: 44.4 % — SIGNIFICANT CHANGE UP (ref 39–50)
HGB BLD-MCNC: 14.8 G/DL — SIGNIFICANT CHANGE UP (ref 13–17)
IMM GRANULOCYTES NFR BLD AUTO: 0.7 % — SIGNIFICANT CHANGE UP (ref 0–1.5)
LYMPHOCYTES # BLD AUTO: 3.33 K/UL — HIGH (ref 1–3.3)
LYMPHOCYTES # BLD AUTO: 30.9 % — SIGNIFICANT CHANGE UP (ref 13–44)
MCHC RBC-ENTMCNC: 30 PG — SIGNIFICANT CHANGE UP (ref 27–34)
MCHC RBC-ENTMCNC: 33.3 GM/DL — SIGNIFICANT CHANGE UP (ref 32–36)
MCV RBC AUTO: 89.9 FL — SIGNIFICANT CHANGE UP (ref 80–100)
MONOCYTES # BLD AUTO: 0.68 K/UL — SIGNIFICANT CHANGE UP (ref 0–0.9)
MONOCYTES NFR BLD AUTO: 6.3 % — SIGNIFICANT CHANGE UP (ref 2–14)
NEUTROPHILS # BLD AUTO: 6.53 K/UL — SIGNIFICANT CHANGE UP (ref 1.8–7.4)
NEUTROPHILS NFR BLD AUTO: 60.8 % — SIGNIFICANT CHANGE UP (ref 43–77)
NRBC # BLD: 0 /100 WBCS — SIGNIFICANT CHANGE UP (ref 0–0)
PLATELET # BLD AUTO: 282 K/UL — SIGNIFICANT CHANGE UP (ref 150–400)
POTASSIUM SERPL-MCNC: 4 MMOL/L — SIGNIFICANT CHANGE UP (ref 3.5–5.3)
POTASSIUM SERPL-SCNC: 4 MMOL/L — SIGNIFICANT CHANGE UP (ref 3.5–5.3)
PROT SERPL-MCNC: 7.4 G/DL — SIGNIFICANT CHANGE UP (ref 6–8.3)
RBC # BLD: 4.94 M/UL — SIGNIFICANT CHANGE UP (ref 4.2–5.8)
RBC # FLD: 12.7 % — SIGNIFICANT CHANGE UP (ref 10.3–14.5)
SODIUM SERPL-SCNC: 134 MMOL/L — LOW (ref 135–145)
WBC # BLD: 10.76 K/UL — HIGH (ref 3.8–10.5)
WBC # FLD AUTO: 10.76 K/UL — HIGH (ref 3.8–10.5)

## 2020-01-28 PROCEDURE — 96375 TX/PRO/DX INJ NEW DRUG ADDON: CPT

## 2020-01-28 PROCEDURE — 99284 EMERGENCY DEPT VISIT MOD MDM: CPT | Mod: 25

## 2020-01-28 PROCEDURE — 99284 EMERGENCY DEPT VISIT MOD MDM: CPT

## 2020-01-28 PROCEDURE — 80053 COMPREHEN METABOLIC PANEL: CPT

## 2020-01-28 PROCEDURE — 96374 THER/PROPH/DIAG INJ IV PUSH: CPT

## 2020-01-28 PROCEDURE — 85027 COMPLETE CBC AUTOMATED: CPT

## 2020-01-28 RX ORDER — DEXAMETHASONE 0.5 MG/5ML
6 ELIXIR ORAL ONCE
Refills: 0 | Status: COMPLETED | OUTPATIENT
Start: 2020-01-28 | End: 2020-01-28

## 2020-01-28 RX ORDER — METOCLOPRAMIDE HCL 10 MG
10 TABLET ORAL ONCE
Refills: 0 | Status: COMPLETED | OUTPATIENT
Start: 2020-01-28 | End: 2020-01-28

## 2020-01-28 RX ORDER — KETOROLAC TROMETHAMINE 30 MG/ML
15 SYRINGE (ML) INJECTION ONCE
Refills: 0 | Status: DISCONTINUED | OUTPATIENT
Start: 2020-01-28 | End: 2020-01-28

## 2020-01-28 RX ORDER — SUMATRIPTAN SUCCINATE 4 MG/.5ML
1 INJECTION, SOLUTION SUBCUTANEOUS
Qty: 6 | Refills: 0
Start: 2020-01-28

## 2020-01-28 RX ADMIN — Medication 6 MILLIGRAM(S): at 21:06

## 2020-01-28 RX ADMIN — Medication 10 MILLIGRAM(S): at 21:04

## 2020-01-28 RX ADMIN — Medication 15 MILLIGRAM(S): at 21:06

## 2020-01-28 NOTE — ED ADULT TRIAGE NOTE - CHIEF COMPLAINT QUOTE
s/p coronary stent 4 days ago. denies chest pain, N/v, dizziness, fever or chills. A&Ox4. ambulatory

## 2020-01-28 NOTE — CONSULT NOTE ADULT - SUBJECTIVE AND OBJECTIVE BOX
HPI:  49 year old [place] male with PMHx of HTN, HLD, CAD s/p stent (4 years ago at [facility] with known 60-70% obstructive CAD) here w/ HA. Came with chest pain, got stents in house, started developing HA then. Came back to ER after for same HA, discharged again. Now here with repeat episode of similar HA. The headache is on the crown of his head, and is associated with photophobia, nausea, and vomiting.     ROS: as above    PAST MEDICAL & SURGICAL HISTORY:  Myocardial infarct  CAD (coronary artery disease)  HLD (hyperlipidemia)  HTN (hypertension)  H/O heart artery stent: total of 2 stents to heart . last stent was placed 1/21/20  S/P appendectomy    FAMILY HISTORY:  FH: CAD (coronary artery disease): Father s/p stent at 69 y/o age  FH: hypertension: Mother  FH: diabetes mellitus: Mother    SOCIAL HISTORY:   T/E/D:   Occupation:   Lives with:     MEDICATIONS (HOME):  Home Medications:    MEDICATIONS  (STANDING):  dexAMETHasone  Injectable 6 milliGRAM(s) IV Push Once  ketorolac   Injectable 15 milliGRAM(s) IV Push Once  metoclopramide Injectable 10 milliGRAM(s) IV Push once    MEDICATIONS  (PRN):    ALLERGIES/INTOLERANCES:  Allergies  No Known Allergies    Intolerances    VITALS & EXAMINATION:  Vital Signs Last 24 Hrs  T(C): 36.6 (28 Jan 2020 15:51), Max: 36.6 (28 Jan 2020 15:51)  T(F): 97.8 (28 Jan 2020 15:51), Max: 97.8 (28 Jan 2020 15:51)  HR: 60 (28 Jan 2020 15:51) (60 - 60)  BP: 126/84 (28 Jan 2020 15:51) (126/84 - 126/84)  BP(mean): --  RR: 16 (28 Jan 2020 15:51) (16 - 16)  SpO2: 96% (28 Jan 2020 15:51) (96% - 96%)    General:  Constitutional: Appears stated age, in no apparent distress including pain  Head: Normocephalic & atraumatic.  Extremities: No cyanosis, clubbing, or edema.  Skin: No rashes, bruising, or discoloration.    Neurological (>12):  MS: Awake, alert, oriented to person, place, situation, time. Normal affect. Follows all commands.    Language: Speech is clear, fluent with good repetition & comprehension     CNs: VFF. EOMI no nystagmus, no diplopia. V1-3 intact to LT, well developed masseter muscles b/l. No facial asymmetry b/l, full eye closure strength b/l.  Symmetric palate elevation in midline. Gag reflex deferred. Tongue midline, normal movements, no atrophy.    Motor: Normal muscle bulk & tone. No noticeable tremor or seizure. No pronator drift.  5/5 shoulder abduction, elbow flex/ext, forearm flexors bilaterally  5/5 hip flex, knee flex/ext, dorsi/plantar    Sensation: Intact to LT    Reflexes:  +2 bi/brach/pat/ankle bilaterally  Plantar equivocal    Coordination: intact rapid-alt movements. No dysmetria to FTN/HTS    Gait: Normal    LABORATORY:  CBC                       14.8   10.76 )-----------( 282      ( 28 Jan 2020 18:57 )             44.4     Chem 01-28    134<L>  |  100  |  18  ----------------------------<  102<H>  4.0   |  23  |  0.84    Ca    9.7      28 Jan 2020 18:57    TPro  7.4  /  Alb  4.2  /  TBili  0.4  /  DBili  x   /  AST  27  /  ALT  52<H>  /  AlkPhos  91  01-28    LFTs LIVER FUNCTIONS - ( 28 Jan 2020 18:57 )  Alb: 4.2 g/dL / Pro: 7.4 g/dL / ALK PHOS: 91 U/L / ALT: 52 U/L / AST: 27 U/L / GGT: x           Coagulopathy   Lipid Panel 01-22 Chol 220<H> <H> HDL 45 Trig 172<H>  A1c 01-22 [id number] 5.6    Cardiac enzymes     U/A   CSF  Immunological  Other    STUDIES & IMAGING:  Studies (EKG, EEG, EMG, etc):     Radiology (XR, CT, MR, U/S, TTE/LANA): HPI:  49 year old Stateless male with PMHx of HTN, HLD, CAD s/p stent (4 years ago at [facility] with known 60-70% obstructive CAD) here w/ HA. Came with chest pain, got stents in house, started developing HA then. Came back to ER after for same HA, discharged again. Now here with repeat episode of similar HA. The headache started posterior and moves anterior to the crown of his head, waxes and wanes in intensity(sometimes 10/10). Denies photophobia, phonophobia, nausea. First occurred prior to heart stents (1/21/20).     ROS: as above    PAST MEDICAL & SURGICAL HISTORY:  Myocardial infarct  CAD (coronary artery disease)  HLD (hyperlipidemia)  HTN (hypertension)  H/O heart artery stent: total of 2 stents to heart . last stent was placed 1/21/20  S/P appendectomy    FAMILY HISTORY:  FH: CAD (coronary artery disease): Father s/p stent at 71 y/o age  FH: hypertension: Mother  FH: diabetes mellitus: Mother    SOCIAL HISTORY:   T/E/D:   Occupation:   Lives with:     MEDICATIONS (HOME):  Home Medications:    MEDICATIONS  (STANDING):  dexAMETHasone  Injectable 6 milliGRAM(s) IV Push Once  ketorolac   Injectable 15 milliGRAM(s) IV Push Once  metoclopramide Injectable 10 milliGRAM(s) IV Push once    MEDICATIONS  (PRN):    ALLERGIES/INTOLERANCES:  Allergies  No Known Allergies    Intolerances    VITALS & EXAMINATION:  Vital Signs Last 24 Hrs  T(C): 36.6 (28 Jan 2020 15:51), Max: 36.6 (28 Jan 2020 15:51)  T(F): 97.8 (28 Jan 2020 15:51), Max: 97.8 (28 Jan 2020 15:51)  HR: 60 (28 Jan 2020 15:51) (60 - 60)  BP: 126/84 (28 Jan 2020 15:51) (126/84 - 126/84)  BP(mean): --  RR: 16 (28 Jan 2020 15:51) (16 - 16)  SpO2: 96% (28 Jan 2020 15:51) (96% - 96%)    General:  MS: Awake, alert, oriented to person, place, situation, time. Normal affect. Follows all commands.    Language: Speech is clear, fluent with good repetition & comprehension     CNs: eyes moving spontaneously in all directions. well developed masseter muscles b/l. No facial asymmetry b/l. Symmetric palate elevation in midline. Gag reflex deferred. Tongue midline, normal movements, no atrophy.    Motor: Normal muscle bulk & tone. No noticeable tremor or seizure. No pronator drift.  Moving all extremities equally without laterality    Sensation: Intact to LT    Normal    LABORATORY:  CBC                       14.8   10.76 )-----------( 282      ( 28 Jan 2020 18:57 )             44.4     Chem 01-28    134<L>  |  100  |  18  ----------------------------<  102<H>  4.0   |  23  |  0.84    Ca    9.7      28 Jan 2020 18:57    TPro  7.4  /  Alb  4.2  /  TBili  0.4  /  DBili  x   /  AST  27  /  ALT  52<H>  /  AlkPhos  91  01-28    LFTs LIVER FUNCTIONS - ( 28 Jan 2020 18:57 )  Alb: 4.2 g/dL / Pro: 7.4 g/dL / ALK PHOS: 91 U/L / ALT: 52 U/L / AST: 27 U/L / GGT: x           Coagulopathy   Lipid Panel 01-22 Chol 220<H> <H> HDL 45 Trig 172<H>  A1c 01-22 [id number] 5.6    Cardiac enzymes     U/A   CSF  Immunological  Other    STUDIES & IMAGING:  Studies (EKG, EEG, EMG, etc):     Radiology (XR, CT, MR, U/S, TTE/LANA):

## 2020-01-28 NOTE — ED PROVIDER NOTE - PROGRESS NOTE DETAILS
PA note: patient presents with intractable HA. Will get basic labs. medicated for HA. Neuro consult. Reassess. ~JOHN Mccord patient seen by Neuro. rec dc home on Imitrex. outpatient f/u with Dr. Engel. ~JHON Mccord Patient re-examined and re-evaluated. Patient feels much better at this time. ED evaluation, Diagnosis and management discussed with the patient and family in detail. Workup results discussed with ED attending, OK to dc home. NEURO follow up encouraged.  Strict ED return instructions discussed in detail and patient given the opportunity to ask any questions about their discharge diagnosis and instructions. Patient verbalized understanding. ~ JOHN Mccord

## 2020-01-28 NOTE — ED PROVIDER NOTE - NSFOLLOWUPINSTRUCTIONS_ED_ALL_ED_FT
follow up with Dr. Michael Nissenbaum([phone]) for further management outpatient.     See dc instructions printed separately.

## 2020-01-28 NOTE — ED PROVIDER NOTE - CARE PROVIDER_API CALL
Nissenbaum, Michael A (MD)  Neurology  [address] [facility] [address]  [place] [address]  Phone: [phone]  Fax: [phone]  Follow Up Time: 1-3 Days

## 2020-01-28 NOTE — ED PROVIDER NOTE - RAPID ASSESSMENT
49y M w/ PMHx of MI, CAD, HLD, HTN and s/p artery stents (placed 1/16/2020) c/o severe HA that started after being given NTG in the hospital that started 1/15/2019. Endorses a constant HA. A CT scan of head was done while here at [facility]. PT was given steroids which helped relieve sx, but no rx was given. Pt also took Tylenol at 1pm today with no relief of sx.       **Pt seen in waiting room by Elias Easton  documentation completed by Miguel Paula. Pt to be sent to main ED for further evaluation - all orders placed to be followed by MD in the main ED** 49y M w/ PMHx of MI, CAD, HLD, HTN and s/p artery stents (placed 1/16/2020) c/o severe HA that started after being given NTG in the hospital that started 1/15/2019. Endorses a constant HA. A CT scan x 2 of head was done while here at [facility] without acute findings. PT was evaluated by neurology and given steroids, reglan and Depakote which helped relieve sx, but no rx was given. Pt also took Tylenol at 1pm today with no relief of sx.       **Pt seen in waiting room by Elias Easton  documentation completed by Miguel Paula. Pt to be sent to main ED for further evaluation - all orders placed to be followed by MD in the main ED**

## 2020-01-28 NOTE — ED ADULT NURSE NOTE - OBJECTIVE STATEMENT
49YOM pmh MI, CAD, HLD, HTN, stent 1/16/20 presents to ED c/o severe HA. Pt was given sublingual nitro on 1/15/20 which started his HA. Pt states HA is intermittent and when he takes tynenol partial relief. Strength equal and strong bilateral. Denies dizziness, numbness, tingling, abd pain, N/V/D, burning upon urination. Safety and comfort measures provided. Will continue to monitor. Wife at bedside.         **Pt seen in waiting room by Elias Easton  documentation completed by Miguel Paula. Pt to be sent to main ED for further evaluation - all orders placed to be followed by MD in the main ED**

## 2020-01-28 NOTE — ED PROVIDER NOTE - OBJECTIVE STATEMENT
PA NOTE: Patient is a 49 year old male with PMHx of HTN, HLD, CAD s/p distal circumflex stent placement on 1/16/2020 who presents with a "bad headache" x 1 week. HA started intermittently after receiving NTG in hospital on 1/15/2020, became worse 1 week ago. This is patient's 2nd visit in 2 days. CT head done 2 days ago was NEG. Patient also evaluated by Neuro here in ED 2 days ago and given Reglan, Depakote, and Steroids with +relief. HA returned yesterday. +Nausea. DENIES vomiting, neck pain, fever, chills. ~JONH Mccord

## 2020-01-28 NOTE — ED PROVIDER NOTE - ATTENDING CONTRIBUTION TO CARE
Patient seen with PA in main Emergency Department.  Patient having recurrent headaches since recent admission for chest pains, was seen by neurology during first admission, believed it was migranous, headaches improved with treatment.  Presented again to Emergency Department two days ago for recurrence of headache, symptoms improved with treatment.  Tonight again headaches reoccurred.  Denying visual changes, slurred speech, nausea, vomiting, numbness, tingling and weakness.  Tried APAP at home without relief.    A 14 point review of systems is negative except as in HPI or otherwise documented.    Exam:  General: Patient well appearing, vital signs within normal limits  HEENT: airway patent with moist mucous membranes  Cardiac: RRR S1/S2 with strong peripheral pulses  Respiratory: lungs clear without respiratory distress  GI: abdomen soft, non tender, non distended  Neuro: no gross neurologic deficits, CN II-XII intact, normal strength, sensation, coordination, ambulation  Skin: warm, well perfused  Psych: normal mood and affect    Patient well appearing with recurring headaches but normal neurologic exam, non contrast CT head at time of onset unremarkable.  Will treat headaches in Emergency Department, consult neurology regarding outpatient treatment plan.

## 2020-01-28 NOTE — ED PROVIDER NOTE - PATIENT PORTAL LINK FT
You can access the FollowMyHealth Patient Portal offered by [facility] by registering at the following website: [email or web address]. By joining [employer]’s FollowMyHealth portal, you will also be able to view your health information using other applications (apps) compatible with our system.

## 2021-04-19 NOTE — ED PROVIDER NOTE - PROGRESS NOTE DETAILS
bilateral upper extremity Passive ROM was WFL (within functional limits)/bilateral lower extremity Passive ROM was WFL (within functional limits)
patient states headache resolved. To refer for outpatient neurologist

## 2021-11-12 NOTE — ED PROVIDER NOTE - NSTIMEPROVIDERCAREINITIATE_GEN_ER
Called patient to inform that stool sample was brought in wrong container and patient would need to return to lab for collect container and recollect stool sample for test. Patient stated that he did not understand how he brought the wrong container as he was only given one container when he went to the lab to  the container for specimen collection. Patient stated that since he was just out and the weather was not good he may not be able to go back out today to get correct container. Told patient that was ok and to get container when he had a chance. Told him we will call him with results.   
Patient dropped off stool sample today at the lab in the wrong container. Please enter new lab order for stool sample for this patient. Stool sample lab order is for Helicopbacter pylori stool antigen. Please contact patient and let them know they will need to collect the correct container from the lab. Should be collected in White container. If there are any questions, please call the back lab at [phone]. Thank You.  
28-Jan-2020 18:31

## 2022-05-17 ENCOUNTER — EMERGENCY (EMERGENCY)
Facility: HOSPITAL | Age: 52
LOS: 1 days | Discharge: HOME | End: 2022-05-17
Attending: EMERGENCY MEDICINE | Admitting: EMERGENCY MEDICINE
Payer: COMMERCIAL

## 2022-05-17 VITALS
DIASTOLIC BLOOD PRESSURE: 82 MMHG | SYSTOLIC BLOOD PRESSURE: 130 MMHG | RESPIRATION RATE: 18 BRPM | HEART RATE: 60 BPM | TEMPERATURE: 98 F | OXYGEN SATURATION: 99 %

## 2022-05-17 VITALS
TEMPERATURE: 98 F | WEIGHT: 195.11 LBS | OXYGEN SATURATION: 98 % | SYSTOLIC BLOOD PRESSURE: 126 MMHG | RESPIRATION RATE: 16 BRPM | DIASTOLIC BLOOD PRESSURE: 76 MMHG | HEIGHT: 69 IN | HEART RATE: 58 BPM

## 2022-05-17 DIAGNOSIS — Z95.5 PRESENCE OF CORONARY ANGIOPLASTY IMPLANT AND GRAFT: Chronic | ICD-10-CM

## 2022-05-17 DIAGNOSIS — Z90.49 ACQUIRED ABSENCE OF OTHER SPECIFIED PARTS OF DIGESTIVE TRACT: Chronic | ICD-10-CM

## 2022-05-17 PROCEDURE — 99284 EMERGENCY DEPT VISIT MOD MDM: CPT

## 2022-05-17 PROCEDURE — 99283 EMERGENCY DEPT VISIT LOW MDM: CPT

## 2022-05-17 RX ORDER — LIDOCAINE 4 G/100G
1 CREAM TOPICAL ONCE
Refills: 0 | Status: COMPLETED | OUTPATIENT
Start: 2022-05-17 | End: 2022-05-17

## 2022-05-17 RX ORDER — CYCLOBENZAPRINE HYDROCHLORIDE 10 MG/1
10 TABLET, FILM COATED ORAL ONCE
Refills: 0 | Status: COMPLETED | OUTPATIENT
Start: 2022-05-17 | End: 2022-05-17

## 2022-05-17 RX ORDER — CYCLOBENZAPRINE HYDROCHLORIDE 10 MG/1
1 TABLET, FILM COATED ORAL
Qty: 15 | Refills: 0
Start: 2022-05-17 | End: 2022-05-21

## 2022-05-17 RX ORDER — ACETAMINOPHEN 500 MG
975 TABLET ORAL ONCE
Refills: 0 | Status: COMPLETED | OUTPATIENT
Start: 2022-05-17 | End: 2022-05-17

## 2022-05-17 RX ADMIN — CYCLOBENZAPRINE HYDROCHLORIDE 10 MILLIGRAM(S): 10 TABLET, FILM COATED ORAL at 11:13

## 2022-05-17 RX ADMIN — LIDOCAINE 1 PATCH: 4 CREAM TOPICAL at 11:13

## 2022-05-17 RX ADMIN — Medication 975 MILLIGRAM(S): at 11:13

## 2022-05-17 NOTE — ED PROVIDER NOTE - PATIENT PORTAL LINK FT
You can access the FollowMyHealth Patient Portal offered by [facility] by registering at the following website: [email or web address]. By joining [employer]’s FollowMyHealth portal, you will also be able to view your health information using other applications (apps) compatible with our system.

## 2022-05-17 NOTE — ED PROVIDER NOTE - PROGRESS NOTE DETAILS
Patient reporting improvement in symptoms with treatment, now standing and ambulating in Emergency Department without difficulty and feeling improved, will discharge with outpatient follow up.  Emil Lizama M.D.

## 2022-05-17 NOTE — ED PROVIDER NOTE - NSFOLLOWUPINSTRUCTIONS_ED_ALL_ED_FT
Thank you for choosing [facility] for your healthcare.    You were seen for pain in the lower back.  There was no evidence by your history or exam of a life threatening or dangerous cause of your back pain.  We recommend continuing tylenol and lidocaine patches for pain as directed on the packaging as well as the muscle relaxer that was prescribed.    Please follow up with your primary care doctor if the pain does not improve on its own in the next few days.    Return to the Emergency Department for loss of control of your bowels or bladder, loss of sensation in your legs, inability to move your legs due to weakness or for any other concerning symptoms. Thank you for choosing [facility] for your healthcare.    You were seen for pain in the lower back.  There was no evidence by your history or exam of a life threatening or dangerous cause of your back pain.  We recommend continuing tylenol and lidocaine 4% patches for pain as directed on the packaging (which you can buy in any pharmacy) as well as the muscle relaxer that was prescribed.    Please follow up with your primary care doctor if the pain does not improve on its own in the next few days.    Return to the Emergency Department for loss of control of your bowels or bladder, loss of sensation in your legs, inability to move your legs due to weakness or for any other concerning symptoms.

## 2022-05-17 NOTE — ED PROVIDER NOTE - NSICDXPASTMEDICALHX_GEN_ALL_CORE_FT
PAST MEDICAL HISTORY:  CAD (coronary artery disease)     HLD (hyperlipidemia)     HTN (hypertension)     Myocardial infarct

## 2022-05-17 NOTE — ED PROVIDER NOTE - CLINICAL SUMMARY MEDICAL DECISION MAKING FREE TEXT BOX
Lumbar back pain, suspect muscle strain/spasm in etiology, no evidence of acute spinal cord emergency at this time, neurovascularly intact - plan for treatment of pain and re-evaluate
no fever/no dysuria

## 2022-05-17 NOTE — ED PROVIDER NOTE - OBJECTIVE STATEMENT
Patient presenting complaining of R sided lower back pain.  Began yesterday initially mild, worse this morning after bending down.  No radiation of pain.  No medications to date for pain.  Pain worse with movement/changing position.  No loss of bowel or bladder continence.  No loss or change in sensation.  No fevers or chills.  No urinary symptoms.

## 2022-05-17 NOTE — ED PROVIDER NOTE - NSICDXPASTSURGICALHX_GEN_ALL_CORE_FT
PAST SURGICAL HISTORY:  H/O heart artery stent total of 2 stents to heart . last stent was placed 1/21/20    S/P appendectomy

## 2022-05-17 NOTE — ED ADULT NURSE NOTE - OBJECTIVE STATEMENT
52 y/o Male, A&Ox3, complaining of lower back pain in the lumbar region since yesterday starting while he was driving. Pt denies trauma to the area, dysuria, hematuria, incontinence, weakness/numbness/tingling in the extremities. Area of pain tender to touch. Pt appears well, in no current distress. Awaiting MD evaluation.

## 2022-05-17 NOTE — ED PROVIDER NOTE - PHYSICAL EXAMINATION
Exam:  General: Patient well appearing, vital signs within normal limits  HEENT: airway patent with moist mucous membranes  Cardiac: RRR S1/S2 with strong peripheral pulses  Respiratory: lungs clear without respiratory distress  GI: abdomen soft, non tender, non distended  Neuro: no gross neurologic deficits, 5/5 strength LE bilateral, SILT, straight leg raise testing reproduces complaint in back but no radiation down legs  MSK: palpation of R sided lumbar paraspinals reproduces complaint  Skin: warm, well perfused  Psych: normal mood and affect

## 2022-08-11 NOTE — ED ADULT NURSE NOTE - CHIEF COMPLAINT
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      Treatment:  Primary treatment is a steroid (Temovate / Valisone) (clobetasol) cream- start daily - with time decrease to every other day (but if symptoms worsen or skin looks more white:  Back to daily)   Secondarily:  Add estrogen cream (Estrace)  - alternate days for atrophy - related to menopause)     -----------------    Expense of therapy may be an issue as co-pays are at times high  your pharmacist may be able to find a cheaper alternative - their computer should be able to cross check with your insurance company regarding preferred coverage:    Steroid which is used is a very  high potency:   Valisone 0.05% (clobetasol propionate ointment or cream) is 1st line   possible alternatives:          Betamethasone dipropionate 0.05% - ointment (cream would be upper mid-strength)    Diflorasone diacetate 0.05%      or high potency:   Betamethasone valerate 0.1%   Diflorasone diacetate 0.05%     more therapeutic choices include lesser potency creams which are not expected to help as well    Estrogen cream:    Primary therapeutic choice is Estrace cream    alternative would be Premarin cream          --------------       The patient is a 49y Male complaining of chest pain.

## 2023-06-05 NOTE — ED PROVIDER NOTE - CRANIAL NERVE AND PUPILLARY EXAM
"[facility] Clinical Pharmacy Services: Vancomycin Pharmacokinetic Initial Consult Note    Girish Greenberg is a 66 y.o. male who is on day 1 of pharmacy to dose vancomycin.    Indication: Bone and/or Joint Infection  Consulting Provider: Dr. Burch  Planned Duration of Therapy: 7 days  Loading Dose Ordered or Given: 1750 mg on 6/5 at 1700 ordered    Culture/Source:   6/5 bcx: in process   6/5 RVP: negative    Target: -600 mg/L.hr   Pertinent Vanc Dosing History: none  Other Antimicrobials: CTX 2g q24h    Vitals/Labs  Ht: 182.9 cm (72\"); Wt: 92.1 kg (203 lb 0.7 oz)  Temp Readings from Last 1 Encounters:   06/05/23 97.5 °F (36.4 °C) (Oral)    Estimated Creatinine Clearance: 102.6 mL/min (by C-G formula based on SCr of 0.91 mg/dL).       Results from last 7 days   Lab Units 06/05/23  0320   CREATININE mg/dL 0.91   WBC 10*3/mm3 8.70     Assessment/Plan:    Vancomycin Dose:  Loading dose 1750mg followed by 1000 mg IV every  12  hours  Predictive AUC level for the dose ordered is 498 mg/L.hr, which is within the target of 400-600 mg/L.hr  Vanc Trough has been ordered for 6/7 at 0430  Bmp in am      Pharmacy will follow patient's kidney function and will adjust doses and obtain levels as necessary. Thank you for involving pharmacy in this patient's care. Please contact pharmacy with any questions or concerns.                           Cr Guadarrama [facility]  Clinical Pharmacist    " cranial nerves 2-12 intact

## 2024-02-15 NOTE — ED ADULT NURSE NOTE - SUICIDE SCREENING QUESTION 3
You can access the FollowMyHealth Patient Portal offered by [facility] by registering at the following website: [email or web address]. By joining [email or web address]’s FollowMyHealth portal, you will also be able to view your health information using other applications (apps) compatible with our system.
No

## 2024-05-17 NOTE — ED PROVIDER NOTE - TEMPLATE
HPI   Chief Complaint   Patient presents with    Chest Pain    Shortness of Breath    Weakness, Gen    Nausea     Patient arrives from home with complaints of weakness, sob, nausea and pressure like chest pain that has been ongoing for about 2 weeks       78-year-old female who presents with chest pain.  Patient states has been having chest burning that is been ongoing all morning.  She states that she thinks it might be reflux.  But she describes some shortness of breath associated with it.  Denies any nausea vomiting.  Denies any abdominal pain.  Denies any fevers, chills, or cough.  Patient has no prior cardiac history.  She is not a smoker.                          [place] Coma Scale Score: 15                     Patient History   Past Medical History:   Diagnosis Date    Acute candidiasis of vulva and vagina 06/12/2019    Vaginitis due to Candida albicans    Anesthesia of skin 07/24/2019    Numbness and tingling    Disorder of the skin and subcutaneous tissue, unspecified 04/09/2019    Skin lesion of face    Encounter for fitting and adjustment of other specified devices 07/14/2021    Fitting and adjustment of pessary    Encounter for gynecological examination (general) (routine) without abnormal findings 11/06/2018    Encounter for annual routine gynecological examination    Encounter for other preprocedural examination 11/11/2019    Preop examination    Encounter for screening for nutritional disorder 09/09/2019    Encounter for vitamin deficiency screening    Hyperglycemia, unspecified 09/04/2019    Acute hyperglycemia    Lymphocytic colitis 10/04/2016    Lymphocytic colitis    Myalgia, other site 10/31/2016    Myofascial pain syndrome    Nausea with vomiting, unspecified 10/18/2017    Non-intractable vomiting with nausea, unspecified vomiting type    Osteoarthritis of knee, unspecified 09/04/2019    Osteoarthritis of knee    Other abnormal findings on diagnostic imaging of central nervous system 01/11/2018     Abnormal brain MRI    Other allergic rhinitis 03/12/2018    Other allergic rhinitis    Other seasonal allergic rhinitis 10/06/2016    Seasonal allergies    Overweight     Overweight    Pain in left knee 12/13/2018    Knee pain, left    Pain in left shoulder 07/07/2017    Chronic left shoulder pain    Pain in leg, unspecified 09/11/2018    Leg pain    Pain in right hip 10/17/2019    Right hip pain    Pain in right knee 06/28/2018    Right knee pain    Personal history of other diseases of the circulatory system     History of hypertension    Personal history of other diseases of the circulatory system     History of cardiac murmur    Personal history of other diseases of the digestive system 12/01/2015    History of biliary colic    Personal history of other diseases of the digestive system 09/22/2022    History of gastroesophageal reflux (GERD)    Personal history of other diseases of the respiratory system 05/23/2019    History of acute bronchitis with bronchospasm    Personal history of other diseases of the respiratory system 01/10/2019    History of influenza    Personal history of other diseases of the respiratory system 10/17/2019    History of acute sinusitis    Personal history of other diseases of the respiratory system     History of asthma    Personal history of other endocrine, nutritional and metabolic disease     History of hypercholesterolemia    Personal history of other endocrine, nutritional and metabolic disease 03/06/2018    History of thyroid nodule    Personal history of other mental and behavioral disorders 09/12/2019    History of anxiety    Personal history of other mental and behavioral disorders 05/17/2016    History of panic disorder    Personal history of other specified conditions 10/15/2018    History of nausea and vomiting    Personal history of other specified conditions 12/18/2017    History of palpitations    Personal history of other specified conditions 10/24/2019    History  of snoring    Personal history of other specified conditions 06/07/2019    History of chest pain    Presence of right artificial knee joint 09/11/2018    Status post total right knee replacement    Somnolence 07/24/2019    Daytime somnolence    Strain of muscle, fascia and tendon of lower back, initial encounter 04/27/2018    Strain of lumbar paraspinous muscle, initial encounter    Toxic gastroenteritis and colitis 10/18/2017    Diarrhea due to drug    Unilateral primary osteoarthritis, left knee 11/26/2019    Arthritis of knee, left    Unilateral primary osteoarthritis, left knee 08/14/2019    Arthritis of knee, left    Unilateral primary osteoarthritis, right knee 06/28/2018    Arthritis of knee, right     Past Surgical History:   Procedure Laterality Date    CATARACT EXTRACTION  09/28/2016    Cataract Surgery    CHOLECYSTECTOMY  12/29/2015    Cholecystectomy Laparoscopic    MR HEAD ANGIO WO IV CONTRAST  11/15/2017    MR HEAD ANGIO WO IV CONTRAST 11/15/2017 [facility]    MR NECK ANGIO WO IV CONTRAST  11/15/2017    MR NECK ANGIO WO IV CONTRAST 11/15/2017 [facility] CLINICAL [facility]GAC[facility]    OTHER SURGICAL HISTORY  10/24/2017    Anterior Colporrhaphy, Repair Of Cystocele    OTHER SURGICAL HISTORY  06/10/2022    Knee replacement    OTHER SURGICAL HISTORY  12/29/2015    Biopsy Skin    TUBAL LIGATION  05/12/2015    Tubal Ligation     No family history on file.  Social History     Tobacco Use    Smoking status: Never    Smokeless tobacco: Never   Vaping Use    Vaping status: Never Used   Substance Use Topics    Alcohol use: Not Currently     Comment: RARELY    Drug use: Never       Physical Exam   ED Triage Vitals [05/17/24 1207]   Temperature Heart Rate Respirations BP   36.4 °C (97.5 °F) 64 18 139/81      Pulse Ox Temp Source Heart Rate Source Patient Position   95 % Temporal -- --      BP Location FiO2 (%)     -- --       Physical Exam  Constitutional:       Appearance: Normal appearance. She is normal weight.   HENT:       Head: Normocephalic and atraumatic.      Nose: Nose normal.      Mouth/Throat:      Mouth: Mucous membranes are moist.      Pharynx: Oropharynx is clear.   Eyes:      Extraocular Movements: Extraocular movements intact.      Conjunctiva/sclera: Conjunctivae normal.      Pupils: Pupils are equal, round, and reactive to light.   Cardiovascular:      Rate and Rhythm: Normal rate and regular rhythm.   Pulmonary:      Effort: Pulmonary effort is normal.      Breath sounds: Normal breath sounds.   Abdominal:      General: Abdomen is flat. Bowel sounds are normal.      Palpations: Abdomen is soft.   Musculoskeletal:         General: Normal range of motion.      Cervical back: Normal range of motion and neck supple.   Skin:     General: Skin is warm and dry.      Capillary Refill: Capillary refill takes less than 2 seconds.   Neurological:      General: No focal deficit present.      Mental Status: She is alert.   Psychiatric:         Mood and Affect: Mood normal.         Behavior: Behavior normal.         Thought Content: Thought content normal.         Judgment: Judgment normal.       Labs Reviewed   COMPREHENSIVE METABOLIC PANEL - Abnormal       Result Value    Glucose 99      Sodium 136      Potassium 4.0      Chloride 100      Bicarbonate 27      Anion Gap 13      Urea Nitrogen 13      Creatinine 0.71      eGFR 87      Calcium 9.3      Albumin 3.8      Alkaline Phosphatase 97      Total Protein 6.2 (*)     AST 12      Bilirubin, Total 0.6      ALT 9     MAGNESIUM - Normal    Magnesium 1.91     PROTIME-INR - Normal    Protime 11.9      INR 1.1     SERIAL TROPONIN-INITIAL - Normal    Troponin I, High Sensitivity 4      Narrative:     Less than 99th percentile of normal range cutoff-  Female and children under 18 years old <14 ng/L; Male <21 ng/L: Negative  Repeat testing should be performed if clinically indicated.     Female and children under 18 years old 14-50 ng/L; Male 21-50 ng/L:  Consistent with possible  cardiac damage and possible increased clinical   risk. Serial measurements may help to assess extent of myocardial damage.     >50 ng/L: Consistent with cardiac damage, increased clinical risk and  myocardial infarction. Serial measurements may help assess extent of   myocardial damage.      NOTE: Children less than 1 year old may have higher baseline troponin   levels and results should be interpreted in conjunction with the overall   clinical context.     NOTE: Troponin I testing is performed using a different   testing methodology at [facility] than at other   [facility]. Direct result comparisons should only   be made within the same method.   SERIAL TROPONIN, 1 HOUR - Normal    Troponin I, High Sensitivity 4      Narrative:     Less than 99th percentile of normal range cutoff-  Female and children under 18 years old <14 ng/L; Male <21 ng/L: Negative  Repeat testing should be performed if clinically indicated.     Female and children under 18 years old 14-50 ng/L; Male 21-50 ng/L:  Consistent with possible cardiac damage and possible increased clinical   risk. Serial measurements may help to assess extent of myocardial damage.     >50 ng/L: Consistent with cardiac damage, increased clinical risk and  myocardial infarction. Serial measurements may help assess extent of   myocardial damage.      NOTE: Children less than 1 year old may have higher baseline troponin   levels and results should be interpreted in conjunction with the overall   clinical context.     NOTE: Troponin I testing is performed using a different   testing methodology at [facility] than at other   [facility]. Direct result comparisons should only   be made within the same method.   CBC WITH AUTO DIFFERENTIAL    WBC 6.5      nRBC 0.0      RBC 4.27      Hemoglobin 12.8      Hematocrit 38.9      MCV 91      MCH 30.0      MCHC 32.9      RDW 14.4      Platelets 239      Neutrophils % 65.5      Immature  Granulocytes %, Automated 0.2      Lymphocytes % 23.3      Monocytes % 6.2      Eosinophils % 4.2      Basophils % 0.6      Neutrophils Absolute 4.25      Immature Granulocytes Absolute, Automated 0.01      Lymphocytes Absolute 1.51      Monocytes Absolute 0.40      Eosinophils Absolute 0.27      Basophils Absolute 0.04     TROPONIN SERIES- (INITIAL, 1 HR)    Narrative:     The following orders were created for panel order Troponin I Series, High Sensitivity (0, 1 HR).  Procedure                               Abnormality         Status                     ---------                               -----------         ------                     Troponin I, High Sensiti...[[id number]]  Normal              Final result               Troponin, High Sensitivi...[[id number]]  Normal              Final result                 Please view results for these tests on the individual orders.       XR chest 1 view   Final Result   No acute pulmonary pathology.   Signed by Elder Matos MD            ED Course & MDM   Diagnoses as of 05/17/24 1554   Atypical chest pain       Medical Decision Making  Emergency department course, obtained which showed a normal sinus rhythm at a rate of 62 with nonspecific ST-T wave changes.  Laboratory studies were obtained and reviewed initial high-sensitivity was 4 and repeat was 4 EKG was.  Chest x-ray shows no cardiopulmonary findings.  Patient was given a GI cocktail here which alleviated her symptoms.  I do feel comfortable discharging the patient home.  Patient is instructed to follow-up with her primary care physician.  Patient is agreeable with this plan.    Amount and/or Complexity of Data Reviewed  ECG/medicine tests: independent interpretation performed.     Details: EKG shows a normal sinus rhythm at a rate of 62 with nonspecific ST-T wave changes, interpreted by ED physician.        Procedure  Procedures     Jenifer Rivera DO  05/17/24 7806     Neuro

## 2025-01-02 ENCOUNTER — EMERGENCY (EMERGENCY)
Facility: HOSPITAL | Age: 55
LOS: 1 days | Discharge: HOME | End: 2025-01-02
Attending: EMERGENCY MEDICINE
Payer: COMMERCIAL

## 2025-01-02 VITALS
HEIGHT: 68 IN | SYSTOLIC BLOOD PRESSURE: 119 MMHG | RESPIRATION RATE: 15 BRPM | HEART RATE: 60 BPM | WEIGHT: 190.04 LBS | OXYGEN SATURATION: 97 % | TEMPERATURE: 98 F | DIASTOLIC BLOOD PRESSURE: 78 MMHG

## 2025-01-02 VITALS
OXYGEN SATURATION: 95 % | DIASTOLIC BLOOD PRESSURE: 83 MMHG | RESPIRATION RATE: 16 BRPM | TEMPERATURE: 98 F | HEART RATE: 63 BPM | SYSTOLIC BLOOD PRESSURE: 137 MMHG

## 2025-01-02 DIAGNOSIS — Z95.5 PRESENCE OF CORONARY ANGIOPLASTY IMPLANT AND GRAFT: Chronic | ICD-10-CM

## 2025-01-02 DIAGNOSIS — Z90.49 ACQUIRED ABSENCE OF OTHER SPECIFIED PARTS OF DIGESTIVE TRACT: Chronic | ICD-10-CM

## 2025-01-02 LAB
ALBUMIN SERPL ELPH-MCNC: 4.2 G/DL — SIGNIFICANT CHANGE UP (ref 3.3–5)
ALP SERPL-CCNC: 137 U/L — HIGH (ref 40–120)
ALT FLD-CCNC: 126 U/L — HIGH (ref 10–45)
ANION GAP SERPL CALC-SCNC: 13 MMOL/L — SIGNIFICANT CHANGE UP (ref 5–17)
APTT BLD: 30.9 SEC — SIGNIFICANT CHANGE UP (ref 24.5–35.6)
AST SERPL-CCNC: 65 U/L — HIGH (ref 10–40)
BASOPHILS # BLD AUTO: 0.02 K/UL — SIGNIFICANT CHANGE UP (ref 0–0.2)
BASOPHILS NFR BLD AUTO: 0.3 % — SIGNIFICANT CHANGE UP (ref 0–2)
BILIRUB SERPL-MCNC: 0.8 MG/DL — SIGNIFICANT CHANGE UP (ref 0.2–1.2)
BLD GP AB SCN SERPL QL: NEGATIVE — SIGNIFICANT CHANGE UP
BUN SERPL-MCNC: 26 MG/DL — HIGH (ref 7–23)
CALCIUM SERPL-MCNC: 9.7 MG/DL — SIGNIFICANT CHANGE UP (ref 8.4–10.5)
CHLORIDE SERPL-SCNC: 101 MMOL/L — SIGNIFICANT CHANGE UP (ref 96–108)
CO2 SERPL-SCNC: 24 MMOL/L — SIGNIFICANT CHANGE UP (ref 22–31)
CREAT SERPL-MCNC: 0.97 MG/DL — SIGNIFICANT CHANGE UP (ref 0.5–1.3)
EGFR: 92 ML/MIN/1.73M2 — SIGNIFICANT CHANGE UP
EGFR: 92 ML/MIN/1.73M2 — SIGNIFICANT CHANGE UP
EOSINOPHIL # BLD AUTO: 0.11 K/UL — SIGNIFICANT CHANGE UP (ref 0–0.5)
EOSINOPHIL NFR BLD AUTO: 1.5 % — SIGNIFICANT CHANGE UP (ref 0–6)
GLUCOSE SERPL-MCNC: 108 MG/DL — HIGH (ref 70–99)
HCT VFR BLD CALC: 43.9 % — SIGNIFICANT CHANGE UP (ref 39–50)
HGB BLD-MCNC: 14.6 G/DL — SIGNIFICANT CHANGE UP (ref 13–17)
IMM GRANULOCYTES NFR BLD AUTO: 0.3 % — SIGNIFICANT CHANGE UP (ref 0–0.9)
INR BLD: 1.02 RATIO — SIGNIFICANT CHANGE UP (ref 0.85–1.16)
LIDOCAIN IGE QN: 40 U/L — SIGNIFICANT CHANGE UP (ref 7–60)
LYMPHOCYTES # BLD AUTO: 2.12 K/UL — SIGNIFICANT CHANGE UP (ref 1–3.3)
LYMPHOCYTES # BLD AUTO: 29.4 % — SIGNIFICANT CHANGE UP (ref 13–44)
MCHC RBC-ENTMCNC: 29.6 PG — SIGNIFICANT CHANGE UP (ref 27–34)
MCHC RBC-ENTMCNC: 33.3 G/DL — SIGNIFICANT CHANGE UP (ref 32–36)
MCV RBC AUTO: 89 FL — SIGNIFICANT CHANGE UP (ref 80–100)
MONOCYTES # BLD AUTO: 0.6 K/UL — SIGNIFICANT CHANGE UP (ref 0–0.9)
MONOCYTES NFR BLD AUTO: 8.3 % — SIGNIFICANT CHANGE UP (ref 2–14)
NEUTROPHILS # BLD AUTO: 4.34 K/UL — SIGNIFICANT CHANGE UP (ref 1.8–7.4)
NEUTROPHILS NFR BLD AUTO: 60.2 % — SIGNIFICANT CHANGE UP (ref 43–77)
NRBC # BLD: 0 /100 WBCS — SIGNIFICANT CHANGE UP (ref 0–0)
NRBC BLD-RTO: 0 /100 WBCS — SIGNIFICANT CHANGE UP (ref 0–0)
PLATELET # BLD AUTO: 207 K/UL — SIGNIFICANT CHANGE UP (ref 150–400)
POTASSIUM SERPL-MCNC: 3.9 MMOL/L — SIGNIFICANT CHANGE UP (ref 3.5–5.3)
POTASSIUM SERPL-SCNC: 3.9 MMOL/L — SIGNIFICANT CHANGE UP (ref 3.5–5.3)
PROT SERPL-MCNC: 7.3 G/DL — SIGNIFICANT CHANGE UP (ref 6–8.3)
PROTHROM AB SERPL-ACNC: 11.6 SEC — SIGNIFICANT CHANGE UP (ref 9.9–13.4)
RBC # BLD: 4.93 M/UL — SIGNIFICANT CHANGE UP (ref 4.2–5.8)
RBC # FLD: 13 % — SIGNIFICANT CHANGE UP (ref 10.3–14.5)
RH IG SCN BLD-IMP: POSITIVE — SIGNIFICANT CHANGE UP
SODIUM SERPL-SCNC: 138 MMOL/L — SIGNIFICANT CHANGE UP (ref 135–145)
WBC # BLD: 7.21 K/UL — SIGNIFICANT CHANGE UP (ref 3.8–10.5)
WBC # FLD AUTO: 7.21 K/UL — SIGNIFICANT CHANGE UP (ref 3.8–10.5)

## 2025-03-22 ENCOUNTER — EMERGENCY (EMERGENCY)
Facility: HOSPITAL | Age: 55
LOS: 1 days | Discharge: HOME | End: 2025-03-22
Attending: STUDENT IN AN ORGANIZED HEALTH CARE EDUCATION/TRAINING PROGRAM
Payer: COMMERCIAL

## 2025-03-22 VITALS
HEIGHT: 66 IN | DIASTOLIC BLOOD PRESSURE: 81 MMHG | SYSTOLIC BLOOD PRESSURE: 127 MMHG | WEIGHT: 149.91 LBS | OXYGEN SATURATION: 96 % | TEMPERATURE: 98 F | HEART RATE: 63 BPM | RESPIRATION RATE: 16 BRPM

## 2025-03-22 DIAGNOSIS — Z95.5 PRESENCE OF CORONARY ANGIOPLASTY IMPLANT AND GRAFT: Chronic | ICD-10-CM

## 2025-03-22 DIAGNOSIS — Z90.49 ACQUIRED ABSENCE OF OTHER SPECIFIED PARTS OF DIGESTIVE TRACT: Chronic | ICD-10-CM

## 2025-03-22 PROCEDURE — 99284 EMERGENCY DEPT VISIT MOD MDM: CPT

## 2025-03-22 PROCEDURE — 99283 EMERGENCY DEPT VISIT LOW MDM: CPT

## 2025-03-22 RX ORDER — ACETAMINOPHEN 500 MG/5ML
650 LIQUID (ML) ORAL ONCE
Refills: 0 | Status: COMPLETED | OUTPATIENT
Start: 2025-03-22 | End: 2025-03-22

## 2025-03-22 RX ORDER — IBUPROFEN 200 MG
600 TABLET ORAL ONCE
Refills: 0 | Status: COMPLETED | OUTPATIENT
Start: 2025-03-22 | End: 2025-03-22

## 2025-03-22 RX ADMIN — Medication 600 MILLIGRAM(S): at 13:26

## 2025-03-22 RX ADMIN — Medication 650 MILLIGRAM(S): at 13:25

## 2025-03-22 NOTE — ED PROVIDER NOTE - PATIENT PORTAL LINK FT
You can access the FollowMyHealth Patient Portal offered by [facility] by registering at the following website: [email or web address]. By joining [employer]’s FollowMyHealth portal, you will also be able to view your health information using other applications (apps) compatible with our system.

## 2025-03-22 NOTE — ED ADULT NURSE NOTE - OBJECTIVE STATEMENT
54y male presents with right sided headache x2 days. Pt denies numbness/tingling, chest pain, nausea, vomiting, blurry vision, weakness.

## 2025-03-22 NOTE — ED PROVIDER NOTE - ATTENDING APP SHARED VISIT CONTRIBUTION OF CARE
54-year-old male with no reported past medical history, presents the emergency department with 2 days of gradual onset right-sided headache with no associated symptoms.  Patient reports the pain resolved spontaneously yesterday but returned today prompting visit to the emergency department. Has not take anything for symptoms.  Admits to increased stressors at home. Denies fever, chills, chest pain, shortness of breath, abdominal pain, n/v/d, numbness, weakness, tingling, vision changes.    Physical Exam:  Gen: NAD, awake and alert, non-toxic appearing  HEENT: Atraumatic, oropharynx clear, moist mucous membranes, normal conjunctiva  Cardio: RRR, no murmurs, rubs or gallops  Lung: CTAB, no respiratory distress, no wheezes/rhonchi/rales B/L  MSK: no visible deformities, ROMx4   Neuro: Alert and oriented, no focal deficits, no motor or sensory deficits.   CN II-XII intact.  Cerebellar function normal.   MOTOR STRENGTH: equal, symmetrical, and 5/5 in LUE, RUE, LLE, RLE.   Deep tendon reflexes: 2/4 throughout.   Finger to nose normal.   Station & Gait: Gait normal.  SENSORY: equal b/l to light tough throughout.   Skin: Warm, well perfused, no rash, no leg swelling     This patient presents with a gradual onset headache most consistent with tension type headache vs migraine. No headache red flags. Neurologic exam without evidence of meningismus, focal neurologic findings. Presentation not consistent with acute intracranial bleed to include SAH (lack of risk factors, headache history). Presentation not consistent with acute CNS infection to include meningitis or brain abscess. Temporal arteritis unlikely, as is acute angle closure glaucoma given history and physical findings. Presentation not consistent with other acute, emergent causes of headache at this time. Plan to treat symptomatically with pain medication. No indication for imaging/LP at this time.

## 2025-03-22 NOTE — ED PROVIDER NOTE - PROGRESS NOTE DETAILS
Dr. Devika Garcia, ATTENDING: pt agrees with plan of no CT given unremarkable neuro exam and no red flag symptoms. will treat pain with PO meds and reassess  likely dc with PCP follow up Extensive conversation with pt and spouse at bedside. pt w/ no red flag symptoms for HA, VSS, no focal neurologic deficits on exam. Pt has not taken anything for his headache and does endorse recent increased stress last 2 weeks and states "I think it's from stress". Discussed utility of CTH and offered if felt concerned though pt and spouse both would prefer to trial meds and go home and state they will return if symptoms worsen. I counseled them both on strict return precautions and red flag HA sxs. They acknowledged understanding and feel comfortable with plan at this time. - GABRIELA PayanC

## 2025-03-22 NOTE — ED PROVIDER NOTE - NSFOLLOWUPINSTRUCTIONS_ED_ALL_ED_FT
You were evaluated in the emergency department for headache.    We recommend you take 600mg ibuprofen every 6 hours or tylenol 650mg every 6 hours as needed for pain. If needed, you can alternate these medications so that you take one medication every 3 hours. For instance, at noon take ibuprofen, then at 3pm take tylenol, then at 6pm take ibuprofen.    Return to the ER immediately for worsening or uncontrolled pain, difficulty walking, numbness or weakness in your arms or legs, chest pain, shortness of breath, confusion, vomiting, or for any other concerning symptoms.

## 2025-08-04 ENCOUNTER — APPOINTMENT (OUTPATIENT)
Dept: ORTHOPEDIC SURGERY | Facility: CLINIC | Age: 55
End: 2025-08-04

## 2025-08-04 DIAGNOSIS — M17.11 UNILATERAL PRIMARY OSTEOARTHRITIS, RIGHT KNEE: ICD-10-CM

## 2025-08-04 PROBLEM — Z00.00 ENCOUNTER FOR PREVENTIVE HEALTH EXAMINATION: Status: ACTIVE | Noted: 2025-08-04

## 2025-08-04 RX ORDER — ASPIRIN 81 MG/1
81 TABLET, DELAYED RELEASE ORAL
Refills: 0 | Status: ACTIVE | COMMUNITY

## 2025-08-04 RX ORDER — ATORVASTATIN CALCIUM 80 MG/1
TABLET, FILM COATED ORAL
Refills: 0 | Status: ACTIVE | COMMUNITY

## 2025-08-04 RX ORDER — METOPROLOL SUCCINATE 200 MG/1
TABLET, EXTENDED RELEASE ORAL
Refills: 0 | Status: ACTIVE | COMMUNITY

## 2025-08-04 RX ORDER — AMLODIPINE BESYLATE 5 MG/1
TABLET ORAL
Refills: 0 | Status: ACTIVE | COMMUNITY